# Patient Record
Sex: FEMALE | Race: WHITE | Employment: UNEMPLOYED | URBAN - METROPOLITAN AREA
[De-identification: names, ages, dates, MRNs, and addresses within clinical notes are randomized per-mention and may not be internally consistent; named-entity substitution may affect disease eponyms.]

---

## 2017-10-27 ENCOUNTER — HOSPITAL ENCOUNTER (OUTPATIENT)
Dept: INFUSION THERAPY | Age: 37
Setting detail: INFUSION SERIES
Discharge: HOME | End: 2017-10-27
Payer: COMMERCIAL

## 2017-10-27 PROCEDURE — 6360000002 HC RX W HCPCS: Performed by: OBSTETRICS & GYNECOLOGY

## 2017-10-27 PROCEDURE — 96372 THER/PROPH/DIAG INJ SC/IM: CPT

## 2017-10-27 PROCEDURE — 36415 COLL VENOUS BLD VENIPUNCTURE: CPT

## 2017-10-27 RX ADMIN — HUMAN RHO(D) IMMUNE GLOBULIN 300 MCG: 1500 SOLUTION INTRAMUSCULAR; INTRAVENOUS at 15:51

## 2017-11-02 ENCOUNTER — HOSPITAL ENCOUNTER (OUTPATIENT)
Age: 37
Discharge: HOME | End: 2017-11-02
Attending: OBSTETRICS & GYNECOLOGY | Admitting: OBSTETRICS & GYNECOLOGY
Payer: COMMERCIAL

## 2017-11-02 VITALS
BODY MASS INDEX: 29.98 KG/M2 | DIASTOLIC BLOOD PRESSURE: 71 MMHG | HEIGHT: 67 IN | WEIGHT: 191 LBS | HEART RATE: 80 BPM | RESPIRATION RATE: 20 BRPM | TEMPERATURE: 98.1 F | SYSTOLIC BLOOD PRESSURE: 120 MMHG

## 2017-11-02 LAB
ALBUMIN SERPL-MCNC: 3.4 G/DL (ref 3.9–4.9)
ALP BLD-CCNC: 63 U/L (ref 40–130)
ALT SERPL-CCNC: 14 U/L (ref 0–33)
AMPHETAMINE SCREEN, URINE: NORMAL
ANION GAP SERPL CALCULATED.3IONS-SCNC: 12 MEQ/L (ref 7–13)
AST SERPL-CCNC: 17 U/L (ref 0–35)
BARBITURATE SCREEN URINE: NORMAL
BASOPHILS ABSOLUTE: 0 K/UL (ref 0–0.2)
BASOPHILS RELATIVE PERCENT: 0.3 %
BENZODIAZEPINE SCREEN, URINE: NORMAL
BILIRUB SERPL-MCNC: 0.3 MG/DL (ref 0–1.2)
BILIRUBIN URINE: NEGATIVE
BLOOD, URINE: NEGATIVE
BUN BLDV-MCNC: 5 MG/DL (ref 6–20)
CALCIUM SERPL-MCNC: 8.6 MG/DL (ref 8.6–10.2)
CANNABINOID SCREEN URINE: NORMAL
CHLORIDE BLD-SCNC: 101 MEQ/L (ref 98–107)
CLARITY: CLEAR
CO2: 23 MEQ/L (ref 22–29)
COCAINE METABOLITE SCREEN URINE: NORMAL
COLOR: YELLOW
CREAT SERPL-MCNC: 0.5 MG/DL (ref 0.5–0.9)
EOSINOPHILS ABSOLUTE: 0.3 K/UL (ref 0–0.7)
EOSINOPHILS RELATIVE PERCENT: 2.7 %
FETAL FIBRONECTIN: NEGATIVE
GFR AFRICAN AMERICAN: >60
GFR NON-AFRICAN AMERICAN: >60
GLOBULIN: 2.4 G/DL (ref 2.3–3.5)
GLUCOSE BLD-MCNC: 102 MG/DL (ref 74–109)
GLUCOSE URINE: NEGATIVE MG/DL
HCT VFR BLD CALC: 33.3 % (ref 37–47)
HEMOGLOBIN: 11.1 G/DL (ref 12–16)
KETONES, URINE: NEGATIVE MG/DL
LEUKOCYTE ESTERASE, URINE: NEGATIVE
LYMPHOCYTES ABSOLUTE: 1.5 K/UL (ref 1–4.8)
LYMPHOCYTES RELATIVE PERCENT: 13 %
Lab: NORMAL
MCH RBC QN AUTO: 27.7 PG (ref 27–31.3)
MCHC RBC AUTO-ENTMCNC: 33.3 % (ref 33–37)
MCV RBC AUTO: 83.2 FL (ref 82–100)
MONOCYTES ABSOLUTE: 0.6 K/UL (ref 0.2–0.8)
MONOCYTES RELATIVE PERCENT: 5.7 %
NEUTROPHILS ABSOLUTE: 8.8 K/UL (ref 1.4–6.5)
NEUTROPHILS RELATIVE PERCENT: 78.3 %
NITRITE, URINE: NEGATIVE
OPIATE SCREEN URINE: NORMAL
PDW BLD-RTO: 13.9 % (ref 11.5–14.5)
PH UA: 6 (ref 5–9)
PHENCYCLIDINE SCREEN URINE: NORMAL
PLATELET # BLD: 142 K/UL (ref 130–400)
POTASSIUM SERPL-SCNC: 3.3 MEQ/L (ref 3.5–5.1)
PROTEIN UA: NEGATIVE MG/DL
RBC # BLD: 4 M/UL (ref 4.2–5.4)
SODIUM BLD-SCNC: 136 MEQ/L (ref 132–144)
SPECIFIC GRAVITY UA: 1 (ref 1–1.03)
TOTAL PROTEIN: 5.8 G/DL (ref 6.4–8.1)
UROBILINOGEN, URINE: 0.2 E.U./DL
WBC # BLD: 11.3 K/UL (ref 4.8–10.8)

## 2017-11-02 PROCEDURE — 80053 COMPREHEN METABOLIC PANEL: CPT

## 2017-11-02 PROCEDURE — 99283 EMERGENCY DEPT VISIT LOW MDM: CPT | Performed by: OBSTETRICS & GYNECOLOGY

## 2017-11-02 PROCEDURE — 82731 ASSAY OF FETAL FIBRONECTIN: CPT

## 2017-11-02 PROCEDURE — 99283 EMERGENCY DEPT VISIT LOW MDM: CPT

## 2017-11-02 PROCEDURE — 36415 COLL VENOUS BLD VENIPUNCTURE: CPT

## 2017-11-02 PROCEDURE — 80307 DRUG TEST PRSMV CHEM ANLYZR: CPT

## 2017-11-02 PROCEDURE — 81003 URINALYSIS AUTO W/O SCOPE: CPT

## 2017-11-02 PROCEDURE — 85025 COMPLETE CBC W/AUTO DIFF WBC: CPT

## 2017-11-02 RX ORDER — ACETAMINOPHEN 325 MG/1
650 TABLET ORAL EVERY 4 HOURS PRN
Status: DISCONTINUED | OUTPATIENT
Start: 2017-11-02 | End: 2017-11-02 | Stop reason: HOSPADM

## 2017-11-02 NOTE — ED TRIAGE NOTES
To triage with c/o contx, unable to time them but aware that they are there. Rates pain \"3\". Denies any recent sex, sve, LOF, VB. Denies any urinary s&s. +FM. Gown donned, unable to give ua at this time.

## 2017-11-02 NOTE — ED PROVIDER NOTES
FFN-neg and labs-ok; will d/c home to rest; increase fluids and tylenol prn pain; pt has appt with Dr. Abdon Hall tomorrow.     Ventura Goldberg, MD

## 2017-11-06 PROBLEM — R42 DIZZINESS: Status: ACTIVE | Noted: 2017-11-06

## 2017-11-06 PROBLEM — R55 SYNCOPE AND COLLAPSE: Status: ACTIVE | Noted: 2017-11-06

## 2017-11-07 ENCOUNTER — OFFICE VISIT (OUTPATIENT)
Dept: CARDIOLOGY | Age: 37
End: 2017-11-07

## 2017-11-07 VITALS
DIASTOLIC BLOOD PRESSURE: 60 MMHG | WEIGHT: 195 LBS | SYSTOLIC BLOOD PRESSURE: 110 MMHG | HEART RATE: 88 BPM | BODY MASS INDEX: 30.61 KG/M2 | HEIGHT: 67 IN

## 2017-11-07 DIAGNOSIS — R55 SYNCOPE AND COLLAPSE: ICD-10-CM

## 2017-11-07 DIAGNOSIS — R42 DIZZINESS: Primary | ICD-10-CM

## 2017-11-07 PROCEDURE — 99243 OFF/OP CNSLTJ NEW/EST LOW 30: CPT | Performed by: INTERNAL MEDICINE

## 2017-11-07 ASSESSMENT — ENCOUNTER SYMPTOMS: SHORTNESS OF BREATH: 1

## 2017-11-07 NOTE — PROGRESS NOTES
Arm   Position: Sitting   Cuff Size: Large Adult   Pulse: 88   Weight: 195 lb (88.5 kg)   Height: 5' 7\" (1.702 m)         Physical Exam:  Physical Exam   Constitutional: She is oriented to person, place, and time. She appears well-developed and well-nourished. HENT:   Head: Normocephalic and atraumatic. Right Ear: External ear normal.   Left Ear: External ear normal.   Mouth/Throat: Oropharynx is clear and moist.   Eyes: Conjunctivae and EOM are normal. Pupils are equal, round, and reactive to light. Neck: Normal range of motion. Neck supple. Cardiovascular: Normal rate, regular rhythm, normal heart sounds and intact distal pulses. Exam reveals no gallop and no friction rub. No murmur heard. Pulmonary/Chest: Effort normal and breath sounds normal. No respiratory distress. She has no wheezes. She has no rales. She exhibits no tenderness. Abdominal: Soft. Bowel sounds are normal. She exhibits no distension and no mass. There is no tenderness. There is no rebound and no guarding. pregnant   Musculoskeletal: Normal range of motion. Neurological: She is alert and oriented to person, place, and time. She has normal reflexes. Skin: Skin is warm and dry. Psychiatric: She has a normal mood and affect. Her behavior is normal. Judgment and thought content normal.           Assessment/Orders:       ICD-10-CM ICD-9-CM    1. Dizziness R42 780.4    2. Syncope and collapse R55 780.2        No orders of the defined types were placed in this encounter. There are no discontinued medications. No orders of the defined types were placed in this encounter. Plan:  1. Patient to see me in 2 weeks to discuss possible beta blocker  > I will continue to monitor patient clinically, if symptoms develop or worsen, they are to let me know ASAP or head to the nearest emergeny room.   > Follow up as discussed or call office sooner if needed.  > If refills are needed after appointment contact pharmacy.           Electronically signed by: Angélica Rivera MD  11/7/2017 3:10 PM

## 2017-12-09 ENCOUNTER — HOSPITAL ENCOUNTER (OUTPATIENT)
Dept: ULTRASOUND IMAGING | Age: 37
Discharge: HOME | End: 2017-12-09
Payer: COMMERCIAL

## 2017-12-09 DIAGNOSIS — Z34.83 ENCOUNTER FOR SUPERVISION OF NORMAL PREGNANCY IN MULTIGRAVIDA IN THIRD TRIMESTER: ICD-10-CM

## 2017-12-09 PROCEDURE — 76815 OB US LIMITED FETUS(S): CPT

## 2018-03-12 LAB
HPV, INTERPRETATION: NORMAL
PAP SMEAR: NORMAL

## 2018-04-11 ENCOUNTER — HOSPITAL ENCOUNTER (EMERGENCY)
Age: 38
Discharge: HOME | End: 2018-04-11
Attending: EMERGENCY MEDICINE
Payer: COMMERCIAL

## 2018-04-11 VITALS
BODY MASS INDEX: 28.12 KG/M2 | HEIGHT: 66 IN | HEART RATE: 78 BPM | OXYGEN SATURATION: 100 % | DIASTOLIC BLOOD PRESSURE: 78 MMHG | WEIGHT: 175 LBS | SYSTOLIC BLOOD PRESSURE: 104 MMHG | TEMPERATURE: 98 F | RESPIRATION RATE: 20 BRPM

## 2018-04-11 DIAGNOSIS — T78.1XXA ALLERGIC REACTION TO FOOD, INITIAL ENCOUNTER: Primary | ICD-10-CM

## 2018-04-11 LAB
EKG ATRIAL RATE: 74 BPM
EKG P AXIS: 46 DEGREES
EKG P-R INTERVAL: 140 MS
EKG Q-T INTERVAL: 402 MS
EKG QRS DURATION: 84 MS
EKG QTC CALCULATION (BAZETT): 446 MS
EKG R AXIS: 11 DEGREES
EKG T AXIS: -5 DEGREES
EKG VENTRICULAR RATE: 74 BPM

## 2018-04-11 PROCEDURE — 96375 TX/PRO/DX INJ NEW DRUG ADDON: CPT

## 2018-04-11 PROCEDURE — 6360000002 HC RX W HCPCS: Performed by: EMERGENCY MEDICINE

## 2018-04-11 PROCEDURE — 96365 THER/PROPH/DIAG IV INF INIT: CPT

## 2018-04-11 PROCEDURE — 99284 EMERGENCY DEPT VISIT MOD MDM: CPT

## 2018-04-11 PROCEDURE — 2580000003 HC RX 258: Performed by: EMERGENCY MEDICINE

## 2018-04-11 PROCEDURE — 93005 ELECTROCARDIOGRAM TRACING: CPT

## 2018-04-11 PROCEDURE — 2500000003 HC RX 250 WO HCPCS: Performed by: EMERGENCY MEDICINE

## 2018-04-11 PROCEDURE — S0028 INJECTION, FAMOTIDINE, 20 MG: HCPCS | Performed by: EMERGENCY MEDICINE

## 2018-04-11 PROCEDURE — 96366 THER/PROPH/DIAG IV INF ADDON: CPT

## 2018-04-11 RX ORDER — METHYLPREDNISOLONE SODIUM SUCCINATE 125 MG/2ML
125 INJECTION, POWDER, LYOPHILIZED, FOR SOLUTION INTRAMUSCULAR; INTRAVENOUS ONCE
Status: COMPLETED | OUTPATIENT
Start: 2018-04-11 | End: 2018-04-11

## 2018-04-11 RX ORDER — PREDNISONE 20 MG/1
40 TABLET ORAL DAILY
Qty: 4 TABLET | Refills: 0 | Status: SHIPPED | OUTPATIENT
Start: 2018-04-11 | End: 2018-04-13

## 2018-04-11 RX ORDER — 0.9 % SODIUM CHLORIDE 0.9 %
1000 INTRAVENOUS SOLUTION INTRAVENOUS ONCE
Status: COMPLETED | OUTPATIENT
Start: 2018-04-11 | End: 2018-04-11

## 2018-04-11 RX ORDER — SODIUM CHLORIDE 9 MG/ML
INJECTION, SOLUTION INTRAVENOUS
Status: DISCONTINUED
Start: 2018-04-11 | End: 2018-04-11 | Stop reason: HOSPADM

## 2018-04-11 RX ADMIN — FAMOTIDINE 20 MG: 10 INJECTION, SOLUTION INTRAVENOUS at 16:08

## 2018-04-11 RX ADMIN — METHYLPREDNISOLONE SODIUM SUCCINATE 125 MG: 125 INJECTION, POWDER, FOR SOLUTION INTRAMUSCULAR; INTRAVENOUS at 16:07

## 2018-04-11 RX ADMIN — DIPHENHYDRAMINE HYDROCHLORIDE 50 MG: 50 INJECTION, SOLUTION INTRAMUSCULAR; INTRAVENOUS at 16:19

## 2018-04-11 RX ADMIN — SODIUM CHLORIDE 1000 ML: 9 INJECTION, SOLUTION INTRAVENOUS at 16:08

## 2018-04-11 ASSESSMENT — ENCOUNTER SYMPTOMS
TROUBLE SWALLOWING: 0
CHEST TIGHTNESS: 1
WHEEZING: 0
SHORTNESS OF BREATH: 0
EYES NEGATIVE: 1
RHINORRHEA: 0
ABDOMINAL PAIN: 0
ALLERGIC/IMMUNOLOGIC NEGATIVE: 1
NAUSEA: 1
VOMITING: 0

## 2018-04-11 ASSESSMENT — PAIN SCALES - GENERAL: PAINLEVEL_OUTOF10: 8

## 2018-04-11 ASSESSMENT — PAIN DESCRIPTION - LOCATION: LOCATION: CHEST

## 2018-04-13 PROCEDURE — 93010 ELECTROCARDIOGRAM REPORT: CPT | Performed by: INTERNAL MEDICINE

## 2018-10-17 ENCOUNTER — OFFICE VISIT (OUTPATIENT)
Dept: FAMILY MEDICINE CLINIC | Age: 38
End: 2018-10-17
Payer: COMMERCIAL

## 2018-10-17 VITALS
TEMPERATURE: 97.1 F | HEART RATE: 81 BPM | OXYGEN SATURATION: 98 % | WEIGHT: 180.6 LBS | HEIGHT: 66 IN | DIASTOLIC BLOOD PRESSURE: 84 MMHG | RESPIRATION RATE: 12 BRPM | BODY MASS INDEX: 29.02 KG/M2 | SYSTOLIC BLOOD PRESSURE: 110 MMHG

## 2018-10-17 DIAGNOSIS — R35.0 URINARY FREQUENCY: Primary | ICD-10-CM

## 2018-10-17 DIAGNOSIS — J30.89 NON-SEASONAL ALLERGIC RHINITIS, UNSPECIFIED TRIGGER: ICD-10-CM

## 2018-10-17 DIAGNOSIS — R35.0 URINARY FREQUENCY: ICD-10-CM

## 2018-10-17 LAB
BILIRUBIN, POC: NEGATIVE
BLOOD URINE, POC: NEGATIVE
CLARITY, POC: CLEAR
COLOR, POC: YELLOW
GLUCOSE URINE, POC: NEGATIVE
KETONES, POC: NEGATIVE
LEUKOCYTE EST, POC: NEGATIVE
NITRITE, POC: NEGATIVE
PH, POC: 6
PROTEIN, POC: NEGATIVE
SPECIFIC GRAVITY, POC: 1.03
UROBILINOGEN, POC: NEGATIVE

## 2018-10-17 PROCEDURE — 1036F TOBACCO NON-USER: CPT | Performed by: NURSE PRACTITIONER

## 2018-10-17 PROCEDURE — G8427 DOCREV CUR MEDS BY ELIG CLIN: HCPCS | Performed by: NURSE PRACTITIONER

## 2018-10-17 PROCEDURE — G8417 CALC BMI ABV UP PARAM F/U: HCPCS | Performed by: NURSE PRACTITIONER

## 2018-10-17 PROCEDURE — 81003 URINALYSIS AUTO W/O SCOPE: CPT | Performed by: NURSE PRACTITIONER

## 2018-10-17 PROCEDURE — G8484 FLU IMMUNIZE NO ADMIN: HCPCS | Performed by: NURSE PRACTITIONER

## 2018-10-17 PROCEDURE — 99213 OFFICE O/P EST LOW 20 MIN: CPT | Performed by: NURSE PRACTITIONER

## 2018-10-17 RX ORDER — FLUTICASONE PROPIONATE 50 MCG
SPRAY, SUSPENSION (ML) NASAL
Qty: 1 BOTTLE | Refills: 3 | Status: SHIPPED | OUTPATIENT
Start: 2018-10-17

## 2018-10-17 RX ORDER — CIPROFLOXACIN 500 MG/1
500 TABLET, FILM COATED ORAL 2 TIMES DAILY
Qty: 10 TABLET | Refills: 0 | Status: SHIPPED | OUTPATIENT
Start: 2018-10-17 | End: 2018-10-22

## 2018-10-17 ASSESSMENT — ENCOUNTER SYMPTOMS
CONSTIPATION: 0
ABDOMINAL PAIN: 0
VOMITING: 0
DIARRHEA: 0
NAUSEA: 0

## 2018-10-17 ASSESSMENT — PATIENT HEALTH QUESTIONNAIRE - PHQ9
SUM OF ALL RESPONSES TO PHQ9 QUESTIONS 1 & 2: 0
SUM OF ALL RESPONSES TO PHQ QUESTIONS 1-9: 0
1. LITTLE INTEREST OR PLEASURE IN DOING THINGS: 0
SUM OF ALL RESPONSES TO PHQ QUESTIONS 1-9: 0
2. FEELING DOWN, DEPRESSED OR HOPELESS: 0

## 2018-10-17 NOTE — PROGRESS NOTES
you to get things done or get along with other people? 0 - Normal difficulty    Personal, Social, Family History and Allergies    Patient's medications, allergies, past medical, surgical, social and family histories were reviewed and updated as appropriate. ROS    Review of Systems   Constitutional: Negative for activity change, appetite change, chills, diaphoresis, fatigue and fever. Gastrointestinal: Negative for abdominal pain, constipation, diarrhea, nausea and vomiting. Genitourinary: Positive for dysuria and frequency. Negative for decreased urine volume, dyspareunia, enuresis, flank pain, genital sores, hematuria, menstrual problem, pelvic pain, urgency, vaginal bleeding, vaginal discharge and vaginal pain. Allergic/Immunologic: Negative for environmental allergies and food allergies. Vitals    /84   Pulse 81   Temp 97.1 °F (36.2 °C) (Temporal)   Resp 12   Ht 5' 6\" (1.676 m)   Wt 180 lb 9.6 oz (81.9 kg)   LMP 09/17/2018 (Approximate)   SpO2 98%   BMI 29.15 kg/m²     BP Readings from Last 3 Encounters:   10/17/18 110/84   04/11/18 104/78   11/07/17 110/60         Wt Readings from Last 3 Encounters:   10/17/18 180 lb 9.6 oz (81.9 kg)   04/11/18 175 lb (79.4 kg)   11/07/17 195 lb (88.5 kg)       Objective    Physical Exam   Constitutional: She is oriented to person, place, and time. Vital signs are normal. She appears well-developed and well-nourished. HENT:   Head: Normocephalic. Eyes: Conjunctivae and EOM are normal.   Neck: Normal range of motion. Pulmonary/Chest: Effort normal.   Abdominal: Normal appearance. There is no tenderness. There is no CVA tenderness. Musculoskeletal: Normal range of motion. Neurological: She is alert and oriented to person, place, and time. Skin: Skin is warm and dry. Psychiatric: She has a normal mood and affect.  Her speech is normal and behavior is normal. Judgment and thought content normal. Cognition and memory are normal.   Nursing note and vitals reviewed. Assessment and Treatment     Diagnosis Orders   1. Urinary frequency  POCT Urinalysis No Micro (Auto)    Urine Culture    ciprofloxacin (CIPRO) 500 MG tablet   2. Non-seasonal allergic rhinitis, unspecified trigger  fluticasone (FLONASE) 50 MCG/ACT nasal spray       Plan and Follow Up    Orders Placed This Encounter   Procedures    Urine Culture     Standing Status:   Future     Number of Occurrences:   1     Standing Expiration Date:   10/17/2019     Order Specific Question:   Specify (ex-cath, midstream, cysto, etc)? Answer:   midstream    POCT Urinalysis No Micro (Auto)       Orders Placed This Encounter   Medications    ciprofloxacin (CIPRO) 500 MG tablet     Sig: Take 1 tablet by mouth 2 times daily for 5 days     Dispense:  10 tablet     Refill:  0    fluticasone (FLONASE) 50 MCG/ACT nasal spray     Si sprays each nostril daily. Dispense:  1 Bottle     Refill:  3       Return if symptoms worsen or fail to improve. Antibiotics: To prevent antibiotic resistances please take medication as ordered and for thefull duration even if you start to feel better. Females: Consider intake of yogurt or probiotic during antibiotic use and for a few daysafter to help reduce the risk of superinfection (yeast infection). Separate the yogurt and antibiotic by at least 1 hour. Avoid Alcohol . Will complete test of cure based off of culture results. Typically 10 days after treatment is finished. Reviewed with the patient: current clinical status, medications, activities and diet. Side effects,adverse effects of the medication prescribed today, as well as treatment plan/ rationale and result expectations have been discussed with the patient who expresses understanding and desires to proceed. Close followup to evaluate treatment results and for coordination of care.   I have reviewed the patient's medical history in detail and updated the computerized patient

## 2018-10-20 LAB
ORGANISM: ABNORMAL
URINE CULTURE, ROUTINE: ABNORMAL
URINE CULTURE, ROUTINE: ABNORMAL

## 2018-10-24 ENCOUNTER — OFFICE VISIT (OUTPATIENT)
Dept: FAMILY MEDICINE CLINIC | Age: 38
End: 2018-10-24
Payer: COMMERCIAL

## 2018-10-24 VITALS
WEIGHT: 183 LBS | DIASTOLIC BLOOD PRESSURE: 70 MMHG | RESPIRATION RATE: 16 BRPM | HEIGHT: 66 IN | TEMPERATURE: 97.3 F | SYSTOLIC BLOOD PRESSURE: 112 MMHG | HEART RATE: 80 BPM | BODY MASS INDEX: 29.41 KG/M2

## 2018-10-24 DIAGNOSIS — S39.012A STRAIN OF LUMBAR REGION, INITIAL ENCOUNTER: Primary | ICD-10-CM

## 2018-10-24 PROCEDURE — G8427 DOCREV CUR MEDS BY ELIG CLIN: HCPCS | Performed by: NURSE PRACTITIONER

## 2018-10-24 PROCEDURE — G8417 CALC BMI ABV UP PARAM F/U: HCPCS | Performed by: NURSE PRACTITIONER

## 2018-10-24 PROCEDURE — 99213 OFFICE O/P EST LOW 20 MIN: CPT | Performed by: NURSE PRACTITIONER

## 2018-10-24 PROCEDURE — 1036F TOBACCO NON-USER: CPT | Performed by: NURSE PRACTITIONER

## 2018-10-24 PROCEDURE — G8484 FLU IMMUNIZE NO ADMIN: HCPCS | Performed by: NURSE PRACTITIONER

## 2018-10-24 RX ORDER — BACLOFEN 10 MG/1
10 TABLET ORAL 3 TIMES DAILY PRN
Qty: 15 TABLET | Refills: 0 | Status: SHIPPED | OUTPATIENT
Start: 2018-10-24 | End: 2018-10-24 | Stop reason: SDUPTHER

## 2018-10-24 RX ORDER — BACLOFEN 10 MG/1
10 TABLET ORAL 3 TIMES DAILY PRN
Qty: 15 TABLET | Refills: 0 | Status: SHIPPED | OUTPATIENT
Start: 2018-10-24

## 2018-10-24 RX ORDER — METHYLPREDNISOLONE 4 MG/1
TABLET ORAL
Qty: 1 KIT | Refills: 0 | Status: SHIPPED | OUTPATIENT
Start: 2018-10-24

## 2018-10-24 RX ORDER — METHYLPREDNISOLONE 4 MG/1
TABLET ORAL
Qty: 1 KIT | Refills: 0 | Status: SHIPPED | OUTPATIENT
Start: 2018-10-24 | End: 2018-10-24 | Stop reason: SDUPTHER

## 2018-10-24 ASSESSMENT — ENCOUNTER SYMPTOMS
CHEST TIGHTNESS: 0
ABDOMINAL DISTENTION: 0
NAUSEA: 0
SHORTNESS OF BREATH: 0
DIARRHEA: 0
CONSTIPATION: 0
ABDOMINAL PAIN: 0
BACK PAIN: 1
VOMITING: 0

## 2019-01-24 DIAGNOSIS — Z00.00 WELLNESS EXAMINATION: Primary | ICD-10-CM

## 2019-01-24 DIAGNOSIS — Z13.220 SCREENING FOR LIPID DISORDERS: ICD-10-CM

## 2019-08-26 RX ORDER — LORATADINE AND PSEUDOEPHEDRINE 10; 240 MG/1; MG/1
1 TABLET, EXTENDED RELEASE ORAL
COMMUNITY
Start: 2019-02-25

## 2019-08-26 RX ORDER — PHENAZOPYRIDINE HYDROCHLORIDE 200 MG/1
200 TABLET, FILM COATED ORAL
COMMUNITY
Start: 2019-02-01 | End: 2021-09-20 | Stop reason: ALTCHOICE

## 2019-08-26 RX ORDER — FLUTICASONE PROPIONATE 50 MCG
1 SPRAY, SUSPENSION (ML) NASAL
COMMUNITY
Start: 2019-02-25

## 2019-08-26 RX ORDER — FAMOTIDINE 20 MG/1
20 TABLET, FILM COATED ORAL
COMMUNITY
Start: 2019-03-20

## 2020-03-05 ENCOUNTER — TELEPHONE (OUTPATIENT)
Dept: FAMILY MEDICINE CLINIC | Age: 40
End: 2020-03-05

## 2021-09-20 ENCOUNTER — APPOINTMENT (OUTPATIENT)
Dept: CT IMAGING | Age: 41
End: 2021-09-20
Payer: COMMERCIAL

## 2021-09-20 ENCOUNTER — HOSPITAL ENCOUNTER (EMERGENCY)
Age: 41
Discharge: HOME | End: 2021-09-20
Attending: EMERGENCY MEDICINE
Payer: COMMERCIAL

## 2021-09-20 VITALS
WEIGHT: 190 LBS | RESPIRATION RATE: 18 BRPM | HEART RATE: 102 BPM | DIASTOLIC BLOOD PRESSURE: 89 MMHG | TEMPERATURE: 100.8 F | SYSTOLIC BLOOD PRESSURE: 129 MMHG | OXYGEN SATURATION: 99 % | BODY MASS INDEX: 30.67 KG/M2

## 2021-09-20 DIAGNOSIS — N39.0 URINARY TRACT INFECTION WITHOUT HEMATURIA, SITE UNSPECIFIED: Primary | ICD-10-CM

## 2021-09-20 LAB
ALBUMIN SERPL-MCNC: 4.4 G/DL (ref 3.5–4.6)
ALP BLD-CCNC: 105 U/L (ref 40–130)
ALT SERPL-CCNC: 46 U/L (ref 0–33)
ANION GAP SERPL CALCULATED.3IONS-SCNC: 12 MEQ/L (ref 9–15)
AST SERPL-CCNC: 44 U/L (ref 0–35)
BACTERIA: ABNORMAL /HPF
BASOPHILS ABSOLUTE: 0 K/UL (ref 0–0.1)
BASOPHILS RELATIVE PERCENT: 0.2 % (ref 0.1–1.2)
BILIRUB SERPL-MCNC: <0.2 MG/DL (ref 0.2–0.7)
BILIRUBIN URINE: NEGATIVE
BLOOD, URINE: NORMAL
BUN BLDV-MCNC: 8 MG/DL (ref 6–20)
CALCIUM SERPL-MCNC: 9.8 MG/DL (ref 8.5–9.9)
CHLORIDE BLD-SCNC: 104 MEQ/L (ref 95–107)
CLARITY: CLEAR
CO2: 22 MEQ/L (ref 20–31)
COLOR: YELLOW
CREAT SERPL-MCNC: 0.5 MG/DL (ref 0.5–0.9)
EOSINOPHILS ABSOLUTE: 0.1 K/UL (ref 0–0.4)
EOSINOPHILS RELATIVE PERCENT: 1.4 % (ref 0.7–5.8)
EPITHELIAL CELLS, UA: ABNORMAL /HPF
GFR AFRICAN AMERICAN: >60
GFR NON-AFRICAN AMERICAN: >60
GLOBULIN: 2.6 G/DL (ref 2.3–3.5)
GLUCOSE BLD-MCNC: 103 MG/DL (ref 70–99)
GLUCOSE URINE: NEGATIVE MG/DL
HCG(URINE) PREGNANCY TEST: NEGATIVE
HCT VFR BLD CALC: 37.4 % (ref 37–47)
HEMOGLOBIN: 12.2 G/DL (ref 11.2–15.7)
IMMATURE GRANULOCYTES #: 0 K/UL
IMMATURE GRANULOCYTES %: 0.2 %
KETONES, URINE: NEGATIVE MG/DL
LEUKOCYTE ESTERASE, URINE: NORMAL
LYMPHOCYTES ABSOLUTE: 0.5 K/UL (ref 1.2–3.7)
LYMPHOCYTES RELATIVE PERCENT: 9.2 %
MCH RBC QN AUTO: 27.5 PG (ref 25.6–32.2)
MCHC RBC AUTO-ENTMCNC: 32.6 % (ref 32.2–35.5)
MCV RBC AUTO: 84.2 FL (ref 79.4–94.8)
MONOCYTES ABSOLUTE: 0.5 K/UL (ref 0.2–0.9)
MONOCYTES RELATIVE PERCENT: 10.1 % (ref 4.7–12.5)
NEUTROPHILS ABSOLUTE: 3.8 K/UL (ref 1.6–6.1)
NEUTROPHILS RELATIVE PERCENT: 78.9 % (ref 34–71.1)
NITRITE, URINE: NEGATIVE
PDW BLD-RTO: 13.3 % (ref 11.7–14.4)
PH UA: 7 (ref 5–9)
PLATELET # BLD: 170 K/UL (ref 182–369)
POTASSIUM SERPL-SCNC: 3.8 MEQ/L (ref 3.4–4.9)
PROTEIN UA: NORMAL MG/DL
RBC # BLD: 4.44 M/UL (ref 3.93–5.22)
RBC UA: ABNORMAL /HPF (ref 0–2)
SODIUM BLD-SCNC: 138 MEQ/L (ref 135–144)
SPECIFIC GRAVITY UA: 1.02 (ref 1–1.03)
TOTAL PROTEIN: 7 G/DL (ref 6.3–8)
URINE REFLEX TO CULTURE: YES
UROBILINOGEN, URINE: 0.2 E.U./DL
WBC # BLD: 4.9 K/UL (ref 4–10)
WBC UA: ABNORMAL /HPF (ref 0–5)

## 2021-09-20 PROCEDURE — 81001 URINALYSIS AUTO W/SCOPE: CPT

## 2021-09-20 PROCEDURE — 80053 COMPREHEN METABOLIC PANEL: CPT

## 2021-09-20 PROCEDURE — 87077 CULTURE AEROBIC IDENTIFY: CPT

## 2021-09-20 PROCEDURE — 74176 CT ABD & PELVIS W/O CONTRAST: CPT

## 2021-09-20 PROCEDURE — 96365 THER/PROPH/DIAG IV INF INIT: CPT

## 2021-09-20 PROCEDURE — 6360000002 HC RX W HCPCS: Performed by: EMERGENCY MEDICINE

## 2021-09-20 PROCEDURE — 87186 SC STD MICRODIL/AGAR DIL: CPT

## 2021-09-20 PROCEDURE — 87086 URINE CULTURE/COLONY COUNT: CPT

## 2021-09-20 PROCEDURE — 6370000000 HC RX 637 (ALT 250 FOR IP): Performed by: EMERGENCY MEDICINE

## 2021-09-20 PROCEDURE — 2580000003 HC RX 258: Performed by: EMERGENCY MEDICINE

## 2021-09-20 PROCEDURE — 99282 EMERGENCY DEPT VISIT SF MDM: CPT

## 2021-09-20 PROCEDURE — 85025 COMPLETE CBC W/AUTO DIFF WBC: CPT

## 2021-09-20 PROCEDURE — 84703 CHORIONIC GONADOTROPIN ASSAY: CPT

## 2021-09-20 RX ORDER — 0.9 % SODIUM CHLORIDE 0.9 %
1000 INTRAVENOUS SOLUTION INTRAVENOUS ONCE
Status: COMPLETED | OUTPATIENT
Start: 2021-09-20 | End: 2021-09-20

## 2021-09-20 RX ORDER — ACETAMINOPHEN 325 MG/1
650 TABLET ORAL ONCE
Status: COMPLETED | OUTPATIENT
Start: 2021-09-20 | End: 2021-09-20

## 2021-09-20 RX ORDER — PHENAZOPYRIDINE HYDROCHLORIDE 100 MG/1
100 TABLET, FILM COATED ORAL 3 TIMES DAILY PRN
Qty: 6 TABLET | Refills: 0 | Status: SHIPPED | OUTPATIENT
Start: 2021-09-20 | End: 2021-09-22

## 2021-09-20 RX ORDER — CEPHALEXIN 500 MG/1
500 CAPSULE ORAL 3 TIMES DAILY
Qty: 21 CAPSULE | Refills: 0 | Status: SHIPPED | OUTPATIENT
Start: 2021-09-20 | End: 2021-09-27

## 2021-09-20 RX ADMIN — ACETAMINOPHEN 650 MG: 325 TABLET ORAL at 20:00

## 2021-09-20 RX ADMIN — CEFTRIAXONE 1000 MG: 1 INJECTION, POWDER, FOR SOLUTION INTRAMUSCULAR; INTRAVENOUS at 20:01

## 2021-09-20 RX ADMIN — SODIUM CHLORIDE 1000 ML: 9 INJECTION, SOLUTION INTRAVENOUS at 19:59

## 2021-09-20 ASSESSMENT — PAIN DESCRIPTION - ONSET: ONSET: ON-GOING

## 2021-09-20 ASSESSMENT — PAIN DESCRIPTION - PROGRESSION: CLINICAL_PROGRESSION: GRADUALLY IMPROVING

## 2021-09-20 ASSESSMENT — PAIN SCALES - GENERAL
PAINLEVEL_OUTOF10: 2
PAINLEVEL_OUTOF10: 8

## 2021-09-20 ASSESSMENT — PAIN - FUNCTIONAL ASSESSMENT: PAIN_FUNCTIONAL_ASSESSMENT: 0-10

## 2021-09-20 ASSESSMENT — PAIN DESCRIPTION - LOCATION: LOCATION: ABDOMEN

## 2021-09-20 ASSESSMENT — ENCOUNTER SYMPTOMS: ABDOMINAL PAIN: 1

## 2021-09-20 ASSESSMENT — PAIN DESCRIPTION - PAIN TYPE: TYPE: ACUTE PAIN

## 2021-09-20 ASSESSMENT — PAIN DESCRIPTION - DESCRIPTORS: DESCRIPTORS: ACHING

## 2021-09-20 ASSESSMENT — PAIN DESCRIPTION - FREQUENCY: FREQUENCY: CONTINUOUS

## 2021-09-20 NOTE — ED TRIAGE NOTES
Complains of difficulty urinating starting 4 days ago. worse today. Right flank pain starting this morning. 100.8f in triage. Motrin 1 hour PTA.

## 2021-09-21 NOTE — ED PROVIDER NOTES
[facility] ED  EMERGENCY DEPARTMENT ENCOUNTER      Pt Name: Ivan Yeh  MRN: [id number]  [place] 1980  Date of evaluation: 2021  Provider: Brando Quick MD    CHIEF COMPLAINT       Chief Complaint   Patient presents with    Dysuria    Flank Pain         HISTORY OF PRESENT ILLNESS   (Location/Symptom, Timing/Onset, Context/Setting, Quality, Duration, Modifying Factors, Severity)  Note limiting factors. 45-year-old female presenting with urinary symptoms of dysuria/ urgency. Symptoms started yesterday and patient took over-the-counter medication with some improvement noted. Today she noted right-sided flank pain that has since resolved with Motrin. Denies any vomiting. No significant fevers. Denies any abdominal pain. Nursing Notes were reviewed. REVIEW OF SYSTEMS    (2-9 systems for level 4, 10 or more for level 5)     Review of Systems   Gastrointestinal: Positive for abdominal pain. Genitourinary: Positive for dysuria and urgency. All other systems reviewed and are negative. Except as noted above the remainder of the review of systems was reviewed and negative.        PAST MEDICAL HISTORY     Past Medical History:   Diagnosis Date    Asthma     Dizziness 2017    Syncope and collapse 2017         SURGICAL HISTORY       Past Surgical History:   Procedure Laterality Date     SECTION, LOW TRANSVERSE      TUBAL LIGATION           CURRENT MEDICATIONS       Discharge Medication List as of 2021  9:01 PM      CONTINUE these medications which have NOT CHANGED    Details   famotidine (PEPCID) 20 MG tablet Take 20 mg by mouthHistorical Med      loratadine-pseudoephedrine (CLARITIN-D 24HR)  MG per extended release tablet Take 1 tablet by mouthHistorical Med      !! fluticasone (FLONASE) 50 MCG/ACT nasal spray 1 sprayHistorical Med      methylPREDNISolone (MEDROL, NAM,) 4 MG tablet Take by mouth., Disp-1 kit, R-0Normal      baclofen (LIORESAL) 10 MG tablet Take 1 tablet by mouth 3 times daily as needed (muscle spasm), Disp-15 tablet, R-0Normal      !! fluticasone (FLONASE) 50 MCG/ACT nasal spray 1 sprays each nostril daily. , Disp-1 Bottle, R-3Normal       !! - Potential duplicate medications found. Please discuss with provider. ALLERGIES     Nuts [peanut-containing drug products]    FAMILY HISTORY     History reviewed. No pertinent family history. SOCIAL HISTORY       Social History     Socioeconomic History    Marital status:      Spouse name: None    Number of children: None    Years of education: None    Highest education level: None   Occupational History    None   Tobacco Use    Smoking status: Never Smoker    Smokeless tobacco: Never Used   Substance and Sexual Activity    Alcohol use: Yes     Comment: occasionally    Drug use: No    Sexual activity: Not Currently     Partners: Male   Other Topics Concern    None   Social History Narrative    None     Social Determinants of Health     Financial Resource Strain:     Difficulty of Paying Living Expenses:    Food Insecurity:     Worried About Running Out of Food in the Last Year:     Ran Out of Food in the Last Year:    Transportation Needs:     Lack of Transportation (Medical):      Lack of Transportation (Non-Medical):    Physical Activity:     Days of Exercise per Week:     Minutes of Exercise per Session:    Stress:     Feeling of Stress :    Social Connections:     Frequency of Communication with Friends and Family:     Frequency of Social Gatherings with Friends and Family:     Attends [employer] Services:     Active Member of Clubs or Organizations:     Attends Club or Organization Meetings:     Marital Status:    Intimate Partner Violence:     Fear of Current or Ex-Partner:     Emotionally Abused:     Physically Abused:     Sexually Abused:        SCREENINGS               PHYSICAL EXAM    (up to 7 for level 4, 8 or more for level 5)     ED Triage Vitals [09/20/21 1911]   BP Temp Temp Source Pulse Resp SpO2 Height Weight   129/89 100.8 °F (38.2 °C) Oral 102 18 97 % -- 190 lb (86.2 kg)       Physical Exam  Vitals and nursing note reviewed. Constitutional:       General: She is not in acute distress. Appearance: Normal appearance. She is well-developed. She is not ill-appearing. HENT:      Head: Normocephalic and atraumatic. Mouth/Throat:      Mouth: Mucous membranes are moist.      Pharynx: Oropharynx is clear. Eyes:      Extraocular Movements: Extraocular movements intact. Conjunctiva/sclera: Conjunctivae normal.   Cardiovascular:      Rate and Rhythm: Normal rate and regular rhythm. Pulmonary:      Effort: Pulmonary effort is normal.      Breath sounds: Normal breath sounds. Abdominal:      General: Bowel sounds are normal.      Palpations: Abdomen is soft. Tenderness: There is no abdominal tenderness. There is no right CVA tenderness, left CVA tenderness, guarding or rebound. Musculoskeletal:         General: No deformity. Normal range of motion. Cervical back: Normal range of motion and neck supple. Skin:     General: Skin is warm and dry. Capillary Refill: Capillary refill takes less than 2 seconds. Neurological:      General: No focal deficit present. Mental Status: She is alert and oriented to person, place, and time. Mental status is at baseline. Cranial Nerves: No cranial nerve deficit. Psychiatric:         Thought Content:  Thought content normal.         DIAGNOSTIC RESULTS     EKG: All EKG's are interpreted by the Emergency Department Physician who either signs or Co-signs this chart in the absence of a cardiologist.    RADIOLOGY:   Non-plain film images such as CT, Ultrasound and MRI are read by the radiologist. Plain radiographic images are visualized and preliminarily interpreted by the emergency physician with the below findings:    Interpretation per the Radiologist below, if available at the time of this note:    CT ABDOMEN PELVIS WO CONTRAST Additional Contrast? None    (Results Pending)       LABS:  Labs Reviewed   COMPREHENSIVE METABOLIC PANEL - Abnormal; Notable for the following components:       Result Value    Glucose 103 (*)     ALT 46 (*)     AST 44 (*)     All other components within normal limits   CBC WITH AUTO DIFFERENTIAL - Abnormal; Notable for the following components:    Platelets 207 (*)     Neutrophils % 78.9 (*)     Lymphocytes Absolute 0.5 (*)     All other components within normal limits   MICROSCOPIC URINALYSIS - Abnormal; Notable for the following components:    RBC, UA 10-20 (*)     Bacteria, UA MODERATE (*)     All other components within normal limits   CULTURE, URINE   URINE RT REFLEX TO CULTURE   PREGNANCY, URINE       All other labs were within normal range or not returned as of this dictation. EMERGENCY DEPARTMENT COURSE and DIFFERENTIAL DIAGNOSIS/MDM:   Vitals:    Vitals:    09/20/21 1911 09/20/21 2130   BP: 129/89    Pulse: 102    Resp: 18    Temp: 100.8 °F (38.2 °C)    TempSrc: Oral    SpO2: 97% 99%   Weight: 190 lb (86.2 kg)        MDM  Number of Diagnoses or Management Options  Urinary tract infection without hematuria, site unspecified  Diagnosis management comments: Presents with symptoms consistent with UTI. CT scan shows no obstructing stone but does show a distended gallbladder. Patient with slightly elevated liver enzymes. Unable to get ultrasound at this hour. She states presently that she has no abdominal complaints and her repeat abdominal exam is soft and nontender. Instructed to get ultrasound as outpatient for distended gallbladder if symptoms persist after treatment of UTI. Patient will be discharged home in good condition. Patient has been hemodynamically stable throughout ED course and is appropriate for outpatient follow up. Patient should follow up with PCP in 2-3 days or return to ED immediately for any new or worsening symptoms.

## 2021-09-23 LAB
ORGANISM: ABNORMAL
URINE CULTURE, ROUTINE: ABNORMAL

## 2023-05-22 ENCOUNTER — APPOINTMENT (OUTPATIENT)
Dept: WOMENS IMAGING | Age: 43
End: 2023-05-22
Payer: COMMERCIAL

## 2023-05-22 ENCOUNTER — HOSPITAL ENCOUNTER (OUTPATIENT)
Dept: ULTRASOUND IMAGING | Age: 43
Discharge: HOME | End: 2023-05-24
Payer: COMMERCIAL

## 2023-05-22 ENCOUNTER — HOSPITAL ENCOUNTER (OUTPATIENT)
Dept: WOMENS IMAGING | Age: 43
Discharge: HOME | End: 2023-05-24
Payer: COMMERCIAL

## 2023-05-22 VITALS — BODY MASS INDEX: 29.76 KG/M2 | HEIGHT: 67 IN

## 2023-05-22 DIAGNOSIS — R92.8 ABNORMAL MAMMOGRAM: ICD-10-CM

## 2023-05-22 PROCEDURE — G0279 TOMOSYNTHESIS, MAMMO: HCPCS

## 2023-05-22 PROCEDURE — 76642 ULTRASOUND BREAST LIMITED: CPT

## 2024-01-16 ENCOUNTER — CONSULT (OUTPATIENT)
Dept: ENDOCRINOLOGY | Facility: CLINIC | Age: 44
End: 2024-01-16
Payer: COMMERCIAL

## 2024-01-16 VITALS
HEART RATE: 74 BPM | BODY MASS INDEX: 31.21 KG/M2 | TEMPERATURE: 98.3 F | DIASTOLIC BLOOD PRESSURE: 82 MMHG | WEIGHT: 194.2 LBS | SYSTOLIC BLOOD PRESSURE: 121 MMHG | HEIGHT: 66 IN

## 2024-01-16 DIAGNOSIS — Z01.812 ENCOUNTER FOR PREPROCEDURAL LABORATORY EXAMINATION: ICD-10-CM

## 2024-01-16 DIAGNOSIS — N97.1 TUBAL OCCLUSION: ICD-10-CM

## 2024-01-16 DIAGNOSIS — Z31.41 FERTILITY TESTING: Primary | ICD-10-CM

## 2024-01-16 PROCEDURE — 99204 OFFICE O/P NEW MOD 45 MIN: CPT | Performed by: OBSTETRICS & GYNECOLOGY

## 2024-01-16 PROCEDURE — 99214 OFFICE O/P EST MOD 30 MIN: CPT | Performed by: OBSTETRICS & GYNECOLOGY

## 2024-01-16 RX ORDER — LORATADINE 10 MG/1
10 TABLET ORAL DAILY
COMMUNITY

## 2024-01-16 RX ORDER — FAMOTIDINE 20 MG/1
20 TABLET, FILM COATED ORAL DAILY
COMMUNITY

## 2024-01-16 ASSESSMENT — COLUMBIA-SUICIDE SEVERITY RATING SCALE - C-SSRS
2. HAVE YOU ACTUALLY HAD ANY THOUGHTS OF KILLING YOURSELF?: NO
6. HAVE YOU EVER DONE ANYTHING, STARTED TO DO ANYTHING, OR PREPARED TO DO ANYTHING TO END YOUR LIFE?: NO
1. IN THE PAST MONTH, HAVE YOU WISHED YOU WERE DEAD OR WISHED YOU COULD GO TO SLEEP AND NOT WAKE UP?: NO

## 2024-01-16 ASSESSMENT — PAIN SCALES - GENERAL: PAINLEVEL: 0-NO PAIN

## 2024-01-16 ASSESSMENT — PATIENT HEALTH QUESTIONNAIRE - PHQ9
1. LITTLE INTEREST OR PLEASURE IN DOING THINGS: NOT AT ALL
SUM OF ALL RESPONSES TO PHQ9 QUESTIONS 1 AND 2: 0
2. FEELING DOWN, DEPRESSED OR HOPELESS: NOT AT ALL

## 2024-01-16 ASSESSMENT — LIFESTYLE VARIABLES
TOBACCO_USE: NO
HISTORY_ALCOHOL_USE: NO

## 2024-01-16 NOTE — PROGRESS NOTES
In Person    NEW FERTILITY PATIENT VISIT    Referred by:    Accompanied today by: Who is accompanying you to your visit:: Spouse Ru Wallis is a 43 y.o.  female who presents today to discuss the potential of proceeding with fallopian tube anastomosis.  The patient reports that she has had 7 pregnancies in the past, 6 of which were 1 prior partners and 1 was with her current partner.  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  Her most recent delivery was in 2018 by  section, and she had a bilateral tubal ligation performed at that time (performed at [facility]).  She is uncertain which method was used to perform tubal ligation, and records are not available from that procedure at this time.  She and her partner now report that they are interested in the possibility of having more children.  She does report having regular, monthly menses that occur 30-day intervals.       OB Hx     OB History          7    Para   6    Term   6       0    AB   1    Living   6         SAB   1    IAB   0    Ectopic   0    Multiple   1    Live Births   6                 GYN HISTORY   Have you ever been diagnosed with a sexually transmitted disease? Have you ever been diagnosed with a sexually transmitted disease?: No    Please select all that are applicable:    Have you ever had Pelvic Inflammatory Disease? Have you ever had Pelvic Inflammatory Disease?: No    Have you had an abnormal PAP smear? Have you had an abnormal PAP smear?: Yes    Date & Result of last PAP smear: per EMR, last pap was performed at [facility] in 2019, negative  Have you ever had an abnormal Mammogram? Have you ever had an abnormal mammogram?: Yes    Date & result of your last mammogram: Date of last mammogram:: 2023    Do you have pelvic pain? Do you have pelvic pain?: No    How many times per week do you have intercourse? If  applicable, how many times a week are you having intercourse, trying to get pregnant during your fertile window?: N/A    Do you have pain with intercourse? Do you have pain with intercourse?: No    Do you use lubricants with intercourse? If applicable, what type of lubricant are you using?: None    Do you have pain with bowel movements? Do you have pain with bowel movements?: No              Do you have pain with a full bladder? Do you have pain with a full bladder?: No    MENSTRUAL HISTORY  LMP: When was your last menstrual period?: Other    Menarche: If applicable, when was your first occurrence of menstruation?: 12 yr    Contraception: What (if any) type of birth control do you currently use?: Tubal    Cycle length: What is the average number of days between menstrual cycles?: 28    Describe your bleeding: Describe your bleeding:: Average    Dysmenorrhea: Are your menstrual periods painful?: No       ENDOCRINE/INFERTILITY HISTORY  Duration of infertility: If applicable, what is the approximate date you began trying to get pregnant?: Not applicable    Coital Activity/week: If applicable, how many times a week are you having intercourse, trying to get pregnant during your fertile window?: N/A    Nipple Discharge: Do you experience any loss of milk or liquid discharge from the breasts?: No    Vision changes: Are you experiencing any vision changes?: No    Headaches: Are you experiencing headaches?: No    Excess hair growth: Are you experiencing persistent or worsening hair growth on the face, breasts or lower abdomen?: No    Excessive hair loss: Are you experiencing loss of hair from your scalp?: No    Acne: Are you experiencing acne?: No    Oily skin: Oily skin?: No    Recent weight change  Weight gain: Are you experiencing any increase in weight?: No    Weight loss: Are you experiencing any decrease in weight?: No    Exercise more than 3 times a week: Exercise more than 3 times a week?: No      PMH  History  reviewed. No pertinent past medical history.     MEDICATIONS  Current Outpatient Medications on File Prior to Visit   Medication Sig Dispense Refill    famotidine (Pepcid) 20 mg tablet Take 1 tablet (20 mg) by mouth once daily.      loratadine (Claritin) 10 mg tablet Take 1 tablet (10 mg) by mouth once daily.       No current facility-administered medications on file prior to visit.        PSH  Past Surgical History:   Procedure Laterality Date    OTHER SURGICAL HISTORY  2022     section    TUBAL LIGATION          PSYCH HISTORY  Have you ever been diagnosed with a mental health Issue?: No    Have you ever been hospitalized for a mental health disorder?: No       SOCIAL HISTORY  Social History     Tobacco Use    Smoking status: Never    Smokeless tobacco: Never   Substance Use Topics    Alcohol use: Yes    Drug use: Never     Occupation: Your occupation:: RN    Have you ever been incarcerated? Have you ever been incarcerated?: No    Do you have a history of domestic violence? Do you have a history of domestic violence?: No    Do you feel safe at home? Do you feel safe at home?: Yes    Do you have a history of any negative sexual experience such as incest or rape? Do you have a history of any negative sexual experience such as incest or rape?: No       PARTNER HISTORY  Partner Name: Partner name:: Ru Wallis    : Partner :: 85    Occupation: Partner occupation:: Construction    Prior fertility history: Partner Prior Fertility History:: None//fertile    PMH: Partner Past Medical History:: Epilepsy    PSH: Partner Past Surgical History:: Knee surgery    Smoking:Partner: Recent or current tobacco use: Yes    Alcohol Use: Partner: Recent or current alcohol use: No    Drug Use: Partner: Recent or current drug use: No    Medications: Partner Medications: Keppra, Oxtellar    Injuries: Partner: Any history of surgeries or injuries in the reproductive area?: No    STD: Have you ever been diagnosed  "with a sexually transmitted disease?: No    Please select all that are applicable:    SA: Prior Semen Analysis completed?: No    SA Results:      FAMILY HISTORY  Family History   Problem Relation Name Age of Onset    Prostate cancer Paternal Grandfather         CANCER HISTORY    Breast: Breast Cancer: Please answer Yes, No or Unsure. If Yes, please, identify which family member.: No    Ovarian: Ovarian Cancer: Please answer Yes, No or Unsure. If Yes, please, identify which family member.: No    Colon: Colon Cancer: Please answer Yes, No or Unsure. If Yes, please, identify which family member.: No    Endometrial: Endometrial Cancer: Please answer Yes, No or Unsure. If Yes, please, identify which family member.: No      FAMILY VTE HISTORY  Family History of Blood Clots: Blood Clots: Please answer Yes, No or Unsure. If Yes, please, identify which family member.: No      GENETIC HISTORY  Ethnic Background  Patient:    Partner:    Genetic Disease in Family  Patient: Patient: Defects and genetic syndromes? Please answer Yes, No or Unsure: No    Partner: Partner: Defects and genetic syndromes? Please answer Yes, No or Unsure: No    Birth Defects in Family  Patient: Patient: Birth defects? Please answer Yes, No or Unsure: No    Partner: Partner: Birth defects? Please answer Yes, No or Unsure: No    Genetic screening performed previously:       BMI:   BMI Readings from Last 1 Encounters:   24 31.34 kg/m²     VITALS:  /82   Pulse 74   Temp 36.8 °C (98.3 °F)   Ht 1.676 m (5' 6\")   Wt 88.1 kg (194 lb 3.2 oz)   LMP 2023 (Approximate)   BMI 31.34 kg/m²     ASSESSMENT   43 y.o.  female with  tubal facto infertility who is interested in fallopian tube reversal.  Partner SA:  ordered    COUNSELING  The patient's history was discussed at length, and all questions were answered to her satisfaction.  Risk, benefits, details and considerations surrounding fallopian tube reversal were discussed at length.  " "Different routes of surgery were also reviewed including open versus robotic approach.  The patient understands that there is an approximately 50 to 65% likelihood of success regarding becoming pregnant following this procedure.  She also understands that the success rate is \"all the time\" following surgery, and therefore patients who are younger have a higher likelihood of success since they have a longer interval between surgery and menopause.  As the patient desires to have 1 more child, the potential of proceeding to IVF was also reviewed, and details of that process were briefly discussed.  If the patient proceeds with fallopian tube reversal, she understands that there is a chance that 1 or both fallopian tubes may not be able to be repaired.  Our ability to complete this procedure will ultimately depend upon intraoperative findings, though we do recommend completing fertility workup to provide risk stratification.  All questions were answered to their satisfaction.    At this point, we will plan on proceeding with the following fertility work-up.  The patient is also to meet with our offices financial counselor to discuss cost and coverage for IVF and fallopian tube reversal.  We will reach out to the patient to facilitate obtaining her prior surgical records from her tubal ligation at [facility].  Once this work-up is complete, the patient and her partner to follow-up with me.    PLAN  Orders Placed This Encounter   Procedures    Hysterosalpingogram (HSG)    US pelvis transvaginal    FL hysterosalpingogram    Antimullerian Hormone (Amh)    TSH with reflex to Free T4 if abnormal    POCT pregnancy, urine manually resulted   [X ] partner to complete SA  [X ] consultation with financial counselor  [X ] obtain outside records from [facility] regarding operative and pathology report from delivery/BTL    PARTNER  Yes Semen Analysis: Ordered    FOLLOW UP   Consults: Financial consult  Chart to primary nurse for care " coordination and patient check list/education  Enroll in Engaged MD  Take prenatal vitamins, vitamin D 2000 IUs daily  Discussed that PAP and mammogram must be updated if appropriate based on age and clinical history and results received before treatment can begin  Discussed that treatment cannot proceed until checklist items are complete   6 week follow up with MD  Additional testing for BMI < 18 or > 40: No  Sperm Donor:      Ramone Vega  01/16/2024  2:47 PM

## 2024-01-20 ENCOUNTER — LAB (OUTPATIENT)
Dept: LAB | Facility: LAB | Age: 44
End: 2024-01-20
Payer: COMMERCIAL

## 2024-01-20 ENCOUNTER — HOSPITAL ENCOUNTER (OUTPATIENT)
Dept: RADIOLOGY | Facility: CLINIC | Age: 44
Discharge: HOME | End: 2024-01-20
Payer: COMMERCIAL

## 2024-01-20 DIAGNOSIS — Z31.41 FERTILITY TESTING: ICD-10-CM

## 2024-01-20 LAB — TSH SERPL-ACNC: 1.1 MIU/L (ref 0.44–3.98)

## 2024-01-20 PROCEDURE — 36415 COLL VENOUS BLD VENIPUNCTURE: CPT

## 2024-01-20 PROCEDURE — 83516 IMMUNOASSAY NONANTIBODY: CPT

## 2024-01-20 PROCEDURE — 76830 TRANSVAGINAL US NON-OB: CPT | Performed by: RADIOLOGY

## 2024-01-20 PROCEDURE — 76856 US EXAM PELVIC COMPLETE: CPT | Performed by: RADIOLOGY

## 2024-01-20 PROCEDURE — 84443 ASSAY THYROID STIM HORMONE: CPT

## 2024-01-20 PROCEDURE — 76856 US EXAM PELVIC COMPLETE: CPT

## 2024-01-24 ENCOUNTER — DOCUMENTATION (OUTPATIENT)
Dept: ENDOCRINOLOGY | Facility: CLINIC | Age: 44
End: 2024-01-24
Payer: COMMERCIAL

## 2024-01-24 NOTE — PROGRESS NOTES
Called patient and sent checklist via [employer] after npv with Dr. Vega. Patient to call with period for HSG. Patient states she did blood work already and had pelvic. Patient's partner to schedule SA. Patient made follow up visit with Dr. Vega for March. Patient aware that checklist items need to be completed prior to appointment. Reminded patient to get records from previous delivery sent to the office. Patient states she does not think there are records for her last delivery in 2018. Told patient to check with provider that did her delivery to see if op report from her delivery and tubal can be faxed to us. If patient is unable to obtain results she should discuss with Dr. Vega at her follow up appointment to see if he would still be able to proceed with surgery for her.   MAKSIM ANAYA on 1/24/24 at 11:22 AM.

## 2024-01-27 LAB — MIS SERPL-MCNC: 0.03 NG/ML

## 2024-02-06 ENCOUNTER — ANCILLARY PROCEDURE (OUTPATIENT)
Dept: ENDOCRINOLOGY | Facility: CLINIC | Age: 44
End: 2024-02-06
Payer: COMMERCIAL

## 2024-02-06 ENCOUNTER — HOSPITAL ENCOUNTER (OUTPATIENT)
Dept: RADIOLOGY | Facility: HOSPITAL | Age: 44
Discharge: HOME | End: 2024-02-06
Payer: COMMERCIAL

## 2024-02-06 DIAGNOSIS — Z31.41 FERTILITY TESTING: Primary | ICD-10-CM

## 2024-02-06 DIAGNOSIS — Z31.41 FERTILITY TESTING: ICD-10-CM

## 2024-02-06 DIAGNOSIS — Z01.812 ENCOUNTER FOR PREPROCEDURAL LABORATORY EXAMINATION: ICD-10-CM

## 2024-02-06 LAB — PREGNANCY TEST URINE, POC: NEGATIVE

## 2024-02-06 PROCEDURE — 74740 X-RAY FEMALE GENITAL TRACT: CPT

## 2024-02-06 PROCEDURE — 74740 X-RAY FEMALE GENITAL TRACT: CPT | Performed by: OBSTETRICS & GYNECOLOGY

## 2024-02-06 PROCEDURE — 81025 URINE PREGNANCY TEST: CPT | Performed by: NURSE PRACTITIONER

## 2024-02-06 PROCEDURE — 2550000001 HC RX 255 CONTRASTS: Performed by: OBSTETRICS & GYNECOLOGY

## 2024-02-06 PROCEDURE — 58340 CATHETER FOR HYSTEROGRAPHY: CPT | Performed by: NURSE PRACTITIONER

## 2024-02-06 RX ADMIN — IOHEXOL 20 ML: 240 INJECTION, SOLUTION INTRATHECAL; INTRAVASCULAR; INTRAVENOUS; ORAL at 14:00

## 2024-02-06 NOTE — PROGRESS NOTES
Patient ID: Radha Wallis is a 43 y.o. female.    HSG    Date/Time: 2/6/2024 1:57 PM    Performed by: DA Bond  Authorized by: DA Bond    Consent:     Consent obtained:  Verbal and written    Consent given by:  Patient    Risks, benefits, and alternatives were discussed: yes      Risks discussed:  Bleeding, infection and pain  Universal protocol:     Procedure explained and questions answered to patient or proxy's satisfaction: yes      Relevant documents present and verified: yes      Test results available: yes      Imaging studies available: yes      Required blood products, implants, devices, and special equipment available: yes      Immediately prior to procedure, a time out was called: yes      Patient identity confirmed:  Verbally with patient, arm band and hospital-assigned identification number  Indications:     Indications:  Fertility testing  Pre-procedure details:     Skin preparation:  Povidone-iodine  Sedation:     Sedation type:  None  Anesthesia:     Anesthesia method:  None  Procedure specific details:      Assistant: done by this FLORI      Hysterosalpingogram (HSG) risks, benefits, alternatives, and personnel discussed with patient who agreed to proceed.    Procedural time out        Done in room where procedure done: Yes        Done just before starting procedure: Yes                                                 All members of procedural team involved in time-out: Yes                  Active communication used: Yes                                                         All team members agreed on procedure: Yes                                        Patient correctly identified by two identifiers: Yes                                  Correct side and site identified: Yes                                                     All needed special equipment/instruments available: Yes               Prior to the start of the procedure a time out was taken and the following  were verified: The identity of the patient using two patient identifiers.   Urine pregnancy test was performed and was negative.   Risks, benefits, and alternatives of the procedure were explained to the patient.  Informed consent was obtained.     The patient was placed in the dorsal lithotomy position and a sterile speculum was placed in the vagina. The cervix was sterilized with Betadine x 3. The anterior lip of the cervix was grasped with a single-tooth tenaculum. The (balloon/acorn) cannula was then placed in the cervix and secured to the tenaculum. The patient was positioned and images were taken with fluoroscopy as dye was inserted through the cannula. All instruments were then removed. The patient tolerated the procedure well and was discharged home the same day without complications.    PRELIMINARY NURSE PRACTITIONER ASSESSMENT - FOR A FINAL REPORT, PLEASE REFER TO THE FINALIZED DOCUMENTATION IN THE IMAGING TAB   Uterus:  normal contour without filling defects  Tubes:  bilateral tubal fill to midsection. Consistent with tubal blockage due to tubal ligation.  Based on these findings, my recommendation is: Follow up required, chart forwarded to primary MD.    The patient was counseled regarding the above preliminary findings and understands these will be reviewed by the reading physician.   MERON SHERIFF on 2/6/24 at 1:58 PM.       Post-procedure details:     Procedure completion:  Tolerated well, no immediate complications

## 2024-02-20 ENCOUNTER — TELEMEDICINE (OUTPATIENT)
Dept: ENDOCRINOLOGY | Facility: CLINIC | Age: 44
End: 2024-02-20
Payer: COMMERCIAL

## 2024-02-20 VITALS — BODY MASS INDEX: 31.18 KG/M2 | HEIGHT: 66 IN | WEIGHT: 194 LBS

## 2024-02-20 DIAGNOSIS — Z31.41 FERTILITY TESTING: Primary | ICD-10-CM

## 2024-02-20 PROCEDURE — 99215 OFFICE O/P EST HI 40 MIN: CPT | Performed by: OBSTETRICS & GYNECOLOGY

## 2024-02-20 PROCEDURE — 1036F TOBACCO NON-USER: CPT | Performed by: OBSTETRICS & GYNECOLOGY

## 2024-02-20 ASSESSMENT — PATIENT HEALTH QUESTIONNAIRE - PHQ9
SUM OF ALL RESPONSES TO PHQ9 QUESTIONS 1 AND 2: 0
2. FEELING DOWN, DEPRESSED OR HOPELESS: NOT AT ALL
1. LITTLE INTEREST OR PLEASURE IN DOING THINGS: NOT AT ALL

## 2024-02-20 ASSESSMENT — PAIN SCALES - GENERAL: PAINLEVEL: 0-NO PAIN

## 2024-02-20 NOTE — PROGRESS NOTES
Virtual Visit    Follow Up Visit STEFANIE Wallis is a 43 y.o.  female who presents today for a follow up appointment.  She desires to proceed with fallopian tube reanastomosis.  The results of her fertility testing are as seen below.  The patient reports from her tubal ligation at [facility] in 2018 are not available.  She is uncertain if operative reports are available with her OB/GYN.  She will contact that physician to see if reports are able to be obtained.    Testing to date:     Component      Latest Ref Rng 2024   Anti-Mullerian Hormone      <=6.282 ng/mL 0.026    Thyroid Stimulating Hormone      0.44 - 3.98 mIU/L 1.10          Hysterosalpingogram: FL HYSTEROSALPINGOGRAM (2024):   Smooth uterine cavity with no demonstrated changes in endometrial  contour or filling defects noted. The fallopian tubes fill with  contrast to the mid isthmic portion with no further fill or spill,  consistent with history of bilateral tubal ligation. Normal tubal  architecture is noted in residual tube with significant length noted  on both sides and no hydrosalpinx.    US 2024:   1.  Thickening of the endometrial complex measuring 1.7 cm may  represent hyperplasia. Nonspecific trace hypoechoic fluid of the  endometrial canal.  2. Normal size of both ovaries with demonstrated blood flow.  3. Left ovarian 2.4 cm rounded heterogeneously hypoechoic lesion most  likely is a hemorrhagic or involuting cyst.    Partner SA:  ordered, not yet completed    PRIOR history:  The patient reports that she has had 7 pregnancies in the past, 6 of which were 1 prior partners and 1 was with her current partner.  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  Her most recent delivery was in 2018 by  section, and she had a bilateral tubal ligation performed at that time (performed at [facility]).  She is uncertain which method was  "used to perform tubal ligation, and records are not available from that procedure at this time.  She and her partner now report that they are interested in the possibility of having more children.  She does report having regular, monthly menses that occur 30-day intervals.     History reviewed. No pertinent past medical history.  Past Surgical History:   Procedure Laterality Date    OTHER SURGICAL HISTORY  2022     section    TUBAL LIGATION       Current Outpatient Medications on File Prior to Visit   Medication Sig Dispense Refill    famotidine (Pepcid) 20 mg tablet Take 1 tablet (20 mg) by mouth once daily.      loratadine (Claritin) 10 mg tablet Take 1 tablet (10 mg) by mouth once daily.       No current facility-administered medications on file prior to visit.       BMI:   BMI Readings from Last 1 Encounters:   24 31.31 kg/m²     VITALS:  Ht 1.676 m (5' 6\")   Wt 88 kg (194 lb)   LMP  (LMP Unknown)   BMI 31.31 kg/m²   LMP: No LMP recorded (lmp unknown).    This is a telehealth appointment    ASSESSMENT   Radha is a 43-year-old female who returns today to discuss fallopian tube reanastomosis.  Operative reports are not available from [facility], and the patient reports that she will reach out to her OB/GYN to see if they are able to be obtained.  HSG demonstrates fill of the fallopian tubes to the midportion without dilation or evidence of hydrosalpinx.  If operative reports are unable to be obtained and the patient desires to proceed with the procedure, she understands that our ability to complete the procedure for 1 or both tubes will ultimately depend upon the presence and quality of the distal fallopian tubes.  Prior to scheduling the procedure, however I would recommend having her partner complete semen analysis; if severe oligospermia is present fallopian tube anastomosis would not be expected to help her achieve pregnancy and IVF would be recommended.    Patient's ovarian reserve " findings were discussed, and all questions were answered to her satisfaction.  Her AMH level is consistent with diminished ovarian reserve, though this finding is not unexpected at the age of 43.  The patient is still having regular, monthly menses, and she understands that these laboratory findings will not accurately predict how long she has until menopause.  She does understand that her age is associated with diminished egg quality, and that the likelihood of success, even if tubal anastomosis is successful, is expected to be 5-6% or less per month.  Her cumulative likelihood of success will ultimately depend upon how long she continues to ovulate regularly.    Results of the patient's ultrasound were also discussed.  It is reported that the endometrial lining appeared thickened and that there may be a concern for hyperplasia.  Upon review of the images, her lining does appear to be consistent with luteal phase, and hemorrhagic cyst described on one of her ovaries is consistent with corpus luteal cyst.  It may be worth repeating ultrasound in the follicular phase for this patient to make sure her endometrial lining sheds appropriately following menses.  If her lining remains thickened in the follicular phase, endometrial biopsy would be reasonable.  Patient agrees with this plan.    PLAN   [X ] partner to complete semen analysis  [X ] patient to contact us with her next menstrual period to schedule repeat ultrasound in the follicular phase at our office  [X ] patient to follow-up once workup is complete to discuss options and next steps.    Orders Placed This Encounter   Procedures    US pelvis transvaginal       Ramone Vega  02/20/2024  1:11 PM

## 2024-02-25 ENCOUNTER — HOSPITAL ENCOUNTER (EMERGENCY)
Facility: HOSPITAL | Age: 44
Discharge: HOME | End: 2024-02-25
Attending: STUDENT IN AN ORGANIZED HEALTH CARE EDUCATION/TRAINING PROGRAM
Payer: COMMERCIAL

## 2024-02-25 ENCOUNTER — APPOINTMENT (OUTPATIENT)
Dept: CARDIOLOGY | Facility: HOSPITAL | Age: 44
End: 2024-02-25
Payer: COMMERCIAL

## 2024-02-25 VITALS
HEART RATE: 93 BPM | RESPIRATION RATE: 18 BRPM | WEIGHT: 180 LBS | BODY MASS INDEX: 28.93 KG/M2 | OXYGEN SATURATION: 97 % | TEMPERATURE: 99 F | HEIGHT: 66 IN | SYSTOLIC BLOOD PRESSURE: 150 MMHG | DIASTOLIC BLOOD PRESSURE: 83 MMHG

## 2024-02-25 DIAGNOSIS — T78.40XA ALLERGIC REACTION, INITIAL ENCOUNTER: Primary | ICD-10-CM

## 2024-02-25 PROCEDURE — 2500000004 HC RX 250 GENERAL PHARMACY W/ HCPCS (ALT 636 FOR OP/ED): Performed by: STUDENT IN AN ORGANIZED HEALTH CARE EDUCATION/TRAINING PROGRAM

## 2024-02-25 PROCEDURE — 93005 ELECTROCARDIOGRAM TRACING: CPT

## 2024-02-25 PROCEDURE — 99283 EMERGENCY DEPT VISIT LOW MDM: CPT | Mod: 25

## 2024-02-25 PROCEDURE — 2500000001 HC RX 250 WO HCPCS SELF ADMINISTERED DRUGS (ALT 637 FOR MEDICARE OP): Performed by: STUDENT IN AN ORGANIZED HEALTH CARE EDUCATION/TRAINING PROGRAM

## 2024-02-25 RX ORDER — PREDNISONE 20 MG/1
40 TABLET ORAL ONCE
Status: COMPLETED | OUTPATIENT
Start: 2024-02-25 | End: 2024-02-25

## 2024-02-25 RX ORDER — DIPHENHYDRAMINE HCL 25 MG
25 TABLET ORAL ONCE
Status: COMPLETED | OUTPATIENT
Start: 2024-02-25 | End: 2024-02-25

## 2024-02-25 RX ADMIN — PREDNISONE 40 MG: 20 TABLET ORAL at 18:30

## 2024-02-25 RX ADMIN — DIPHENHYDRAMINE HYDROCHLORIDE 25 MG: 25 TABLET ORAL at 18:30

## 2024-02-25 ASSESSMENT — PAIN DESCRIPTION - DESCRIPTORS: DESCRIPTORS: SQUEEZING

## 2024-02-25 ASSESSMENT — PAIN - FUNCTIONAL ASSESSMENT: PAIN_FUNCTIONAL_ASSESSMENT: 0-10

## 2024-02-25 ASSESSMENT — PAIN SCALES - GENERAL: PAINLEVEL_OUTOF10: 8

## 2024-02-25 ASSESSMENT — PAIN DESCRIPTION - LOCATION: LOCATION: CHEST

## 2024-02-26 NOTE — DISCHARGE INSTRUCTIONS
Please follow up with your Primary Care Provider (PCP) within the next 2-3 days regarding your ED visit. These documents have a lot of useful information! Please read them, so you know what to expect, what you can do for yourself at home, and also to know concerning signs warrant a return to the Emergency Department for additional evaluation.  You are welcome back any time. Thank you for entrusting your care to us, I hope we made your visit as pleasant as possible. Wishing you well!    Dr. Pelletier

## 2024-02-26 NOTE — ED PROVIDER NOTES
HPI   Chief Complaint   Patient presents with    Allergic Reaction       Patient is a 43-year-old female presenting to the emergency department due to concerns for allergic reaction.  Patient states that she is allergic to tree nuts and was eating something with peanuts in it but was concerned she was having some chest discomfort that she was having allergic reaction.  Patient took some Pepcid but did not take any Benadryl or use her EpiPen prior to coming to the emergency department.  On my evaluation the patient states that she started to feel improvement.  She denies any difficulty speaking, difficulty swallowing, difficulty breathing, vomiting, diarrhea, skin rashes.      History provided by:  Patient   used: No                        [place] Coma Scale Score: 15                     Patient History   No past medical history on file.  Past Surgical History:   Procedure Laterality Date    OTHER SURGICAL HISTORY  2022     section    TUBAL LIGATION       Family History   Problem Relation Name Age of Onset    Prostate cancer Paternal Grandfather       Social History     Tobacco Use    Smoking status: Never     Passive exposure: Never    Smokeless tobacco: Never   Substance Use Topics    Alcohol use: Yes    Drug use: Never       Physical Exam   ED Triage Vitals [24 1657]   Temperature Heart Rate Respirations BP   37.2 °C (99 °F) 93 18 150/83      Pulse Ox Temp src Heart Rate Source Patient Position   97 % -- -- --      BP Location FiO2 (%)     -- --       Physical Exam  Vitals and nursing note reviewed.   Constitutional:       General: She is not in acute distress.     Appearance: Normal appearance. She is not ill-appearing, toxic-appearing or diaphoretic.   HENT:      Head: Normocephalic and atraumatic.      Nose: Nose normal.      Mouth/Throat:      Mouth: Mucous membranes are moist.      Pharynx: No oropharyngeal exudate or posterior oropharyngeal erythema.   Eyes:       General: No scleral icterus.     Extraocular Movements: Extraocular movements intact.      Pupils: Pupils are equal, round, and reactive to light.   Cardiovascular:      Rate and Rhythm: Normal rate and regular rhythm.      Pulses: Normal pulses.      Heart sounds: Normal heart sounds. No murmur heard.     No friction rub. No gallop.   Pulmonary:      Effort: Pulmonary effort is normal. No respiratory distress.      Breath sounds: Normal breath sounds. No stridor. No wheezing, rhonchi or rales.   Chest:      Chest wall: No tenderness.   Abdominal:      General: Abdomen is flat. There is no distension.      Palpations: Abdomen is soft. There is no mass.      Tenderness: There is no abdominal tenderness. There is no guarding.      Hernia: No hernia is present.   Musculoskeletal:         General: No swelling, tenderness, deformity or signs of injury. Normal range of motion.      Cervical back: Normal range of motion and neck supple. No rigidity.   Skin:     General: Skin is warm and dry.      Capillary Refill: Capillary refill takes less than 2 seconds.      Coloration: Skin is not jaundiced or pale.      Findings: No bruising, erythema, lesion or rash.   Neurological:      General: No focal deficit present.      Mental Status: She is alert and oriented to person, place, and time. Mental status is at baseline.   Psychiatric:         Mood and Affect: Mood normal.         Behavior: Behavior normal.         ED Course & MDM   Diagnoses as of 02/25/24 1918   Allergic reaction, initial encounter       Medical Decision Making  Patient is a 43-year-old female presenting to the emergency department due to concerns for allergic reaction.  Patient does not have any evidence of anaphylaxis.  Patient appears well on physical exam.  Patient was given a dose of Benadryl and prednisone and observed for approximately 2 hours.  On reevaluation the patient states that she is feeling much better.  Patient was advised to continue to  monitor symptoms and return to the emergency department with any worsening allergic reaction or concerns for anaphylaxis.  Patient verbalized understanding of all structures given to her.  Patient was appreciative care and agreeable discharge.        Procedure  Procedures     Praveen Pelletier DO  02/25/24 1918

## 2024-02-29 LAB
ATRIAL RATE: 89 BPM
P AXIS: 52 DEGREES
P OFFSET: 206 MS
P ONSET: 152 MS
PR INTERVAL: 136 MS
Q ONSET: 220 MS
QRS COUNT: 15 BEATS
QRS DURATION: 72 MS
QT INTERVAL: 356 MS
QTC CALCULATION(BAZETT): 433 MS
QTC FREDERICIA: 405 MS
R AXIS: 20 DEGREES
T AXIS: -16 DEGREES
T OFFSET: 398 MS
VENTRICULAR RATE: 89 BPM

## 2024-03-01 ENCOUNTER — TELEPHONE (OUTPATIENT)
Dept: ENDOCRINOLOGY | Facility: CLINIC | Age: 44
End: 2024-03-01
Payer: COMMERCIAL

## 2024-03-01 NOTE — TELEPHONE ENCOUNTER
Reason for call: Patient calling to talk to Dr Vega she wants to proceed with her surgery.  Notes:Please call her she wants to talk to a nurse.    Phone call to patient. She states that she understands Dr. Vega wanted her to repeat some testing prior to surgery but she is interested in just moving forward. She states that she has insurance coverage for the procedure until May 1 and is interested in scheduling. Will send a staff message to Dr. Vega to see if she can get scheduled.   Nesha Pollock RNC  03/01/24   1:52 PM

## 2024-03-03 ENCOUNTER — DOCUMENTATION (OUTPATIENT)
Dept: ENDOCRINOLOGY | Facility: CLINIC | Age: 44
End: 2024-03-03
Payer: COMMERCIAL

## 2024-03-03 ENCOUNTER — PREP FOR PROCEDURE (OUTPATIENT)
Dept: ENDOCRINOLOGY | Facility: CLINIC | Age: 44
End: 2024-03-03
Payer: COMMERCIAL

## 2024-03-03 DIAGNOSIS — N97.1 TUBAL OCCLUSION: Primary | ICD-10-CM

## 2024-03-03 RX ORDER — GABAPENTIN 600 MG/1
600 TABLET ORAL ONCE
Status: CANCELLED | OUTPATIENT
Start: 2024-03-03 | End: 2024-03-03

## 2024-03-03 RX ORDER — ACETAMINOPHEN 325 MG/1
975 TABLET ORAL ONCE
Status: CANCELLED | OUTPATIENT
Start: 2024-03-03 | End: 2024-03-03

## 2024-03-03 RX ORDER — CELECOXIB 400 MG/1
400 CAPSULE ORAL ONCE
Status: CANCELLED | OUTPATIENT
Start: 2024-03-03 | End: 2024-03-03

## 2024-03-03 NOTE — PROGRESS NOTES
I contacted the patient by phone to further discuss fallopian tube reversal.  She had previously called in requesting to proceed with the procedure, forgo additional testing (US and SA).  She understands the risks of undiagnosed male factor infertility and structural issues that may impact our ability to complete the procedure, reports that she would still like to move forward with surgery.  I will submit orders and a booking sheet to our , have her follow up for a preoperative appointment once the procedure date is set.  All questions were answered to her satisfaction.    Ramone Vega MD  Reproductive Endocrinology and Infertility   [facility]

## 2024-03-11 ENCOUNTER — APPOINTMENT (OUTPATIENT)
Dept: ENDOCRINOLOGY | Facility: CLINIC | Age: 44
End: 2024-03-11
Payer: COMMERCIAL

## 2025-01-23 ENCOUNTER — TELEPHONE (OUTPATIENT)
Dept: ENDOCRINOLOGY | Facility: CLINIC | Age: 45
End: 2025-01-23
Payer: COMMERCIAL

## 2025-01-23 NOTE — TELEPHONE ENCOUNTER
Returned patient call, no answer, went to voicemail, unable to leave voicemail as voicemail is full.  Janina Burns 01/23/25 4:16 PM

## 2025-01-24 ENCOUNTER — TELEPHONE (OUTPATIENT)
Dept: ENDOCRINOLOGY | Facility: CLINIC | Age: 45
End: 2025-01-24
Payer: COMMERCIAL

## 2025-01-24 NOTE — TELEPHONE ENCOUNTER
Returned patient call regarding follow up with Dr. Vega prior to surgery. Patient last spoke with Dr. Vega in march of 2024. Per Dr. Vega, patient is to have formal virtual follow up to touch base and discuss next steps. Patient agreeable and denies further questions/concerns. Patient transferred to front to schedule appointment.   MIKAYLA LINDSAY on 1/24/25 at 12:06 PM.

## 2025-04-09 ENCOUNTER — TELEMEDICINE (OUTPATIENT)
Dept: ENDOCRINOLOGY | Facility: CLINIC | Age: 45
End: 2025-04-09
Payer: COMMERCIAL

## 2025-04-09 VITALS — BODY MASS INDEX: 28.93 KG/M2 | WEIGHT: 180 LBS | HEIGHT: 66 IN

## 2025-04-09 DIAGNOSIS — N97.1 TUBAL OCCLUSION: Primary | ICD-10-CM

## 2025-04-09 PROCEDURE — 99215 OFFICE O/P EST HI 40 MIN: CPT | Performed by: OBSTETRICS & GYNECOLOGY

## 2025-04-09 ASSESSMENT — PAIN SCALES - GENERAL: PAINLEVEL_OUTOF10: 0-NO PAIN

## 2025-04-09 NOTE — PROGRESS NOTES
Virtual or Telephone Consent: An interactive audio and video telecommunication system which permits real time communications between the patient (at the originating site) and provider (at the distant site) was utilized to provide this telehealth service  N/A    Follow Up Visit STEFANIE Wallis is a 44 y.o.  female who presents today for a follow up appointment.  She desires to proceed with fallopian tube reanastomosis.  The results of her fertility testing are as seen below.  The patient reports from her tubal ligation at [facility] in 2018 are not available.  She is uncertain if operative reports are available with her OB/GYN.  She will contact that physician to see if reports are able to be obtained.    She denies having any changes to her health history since she was last seen in our clinic.  She has continued to have regular, monthly menses and has had no issues with irregular bleeding.     Testing to date:      Component      Latest Ref Rng 2024   Anti-Mullerian Hormone      <=6.282 ng/mL 0.026    Thyroid Stimulating Hormone      0.44 - 3.98 mIU/L 1.10          Hysterosalpingogram: FL HYSTEROSALPINGOGRAM (2024):   Smooth uterine cavity with no demonstrated changes in endometrial  contour or filling defects noted. The fallopian tubes fill with  contrast to the mid isthmic portion with no further fill or spill,  consistent with history of bilateral tubal ligation. Normal tubal  architecture is noted in residual tube with significant length noted  on both sides and no hydrosalpinx.     US 2024:   1.  Endometrial complex measuring 1.7 cm   2. Normal size of both ovaries with demonstrated blood flow.  3. Left ovarian 2.4 cm rounded heterogeneously hypoechoic lesion most likely is a hemorrhagic or involuting cyst.     Patient would like to forgo additional testing (updated lab work, repeat US, and SA). She understands the risks of undiagnosed male factor infertility and structural issues that  "may impact our ability to complete the procedure, reports that she would still like to move forward with surgery.       History reviewed. No pertinent past medical history.  Past Surgical History:   Procedure Laterality Date    OTHER SURGICAL HISTORY  2022     section    TUBAL LIGATION       Current Outpatient Medications on File Prior to Visit   Medication Sig Dispense Refill    famotidine (Pepcid) 20 mg tablet Take 1 tablet (20 mg) by mouth once daily.      loratadine (Claritin) 10 mg tablet Take 1 tablet (10 mg) by mouth once daily.       No current facility-administered medications on file prior to visit.       BMI:   BMI Readings from Last 1 Encounters:   25 29.05 kg/m²     VITALS:  Ht 1.676 m (5' 6\")   Wt 81.6 kg (180 lb)   LMP 2025 (Approximate)   BMI 29.05 kg/m²   LMP: Patient's last menstrual period was 2025 (approximate).    This is a telehealth appointment    ASSESSMENT     Radha is a 44-year-old female who returns today to discuss fallopian tube reanastomosis. Operative reports are not available from [facility], and the patient reports that she will reach out to her OB/GYN to see if they are able to be obtained. HSG demonstrates fill of the fallopian tubes to the midportion without dilation or evidence of hydrosalpinx. If operative reports are unable to be obtained and the patient desires to proceed with the procedure, she understands that our ability to complete the procedure for 1 or both tubes will ultimately depend upon the presence and quality of the distal fallopian tubes. She verbalizes understanding of this.  Possibility of proceeding with IVF was also discussed.  Patient reports that she is interested in fallopian tube reanastomosis.    Her ovarian reserve findings were also discussed, and all questions were answered to her satisfaction.  Her AMH level is consistent with diminished ovarian reserve, though this finding is not unexpected at the age of 43 (when " testing was last checked).  The patient was offered the opportunity to update ovarian reserve testing as 1 year has passed since prior evaluation, however she declined.  The patient is still having regular, monthly menses, and she understands that these laboratory findings will not accurately predict how long she has until menopause.  She does understand that her age is associated with diminished egg quality, and that the likelihood of success, even if tubal anastomosis is successful, is expected to be 5-6% or less per month.  Her cumulative likelihood of success will ultimately depend upon how long she continues to ovulate regularly.     Routine Testing  Fertility Center  STDs Within 1 year   Genetic carrier Waiver/Completed   T&S Within 1 year   AMH Within 1 year   TSH Within 1 year   Rubella/Varicella Within 5 years     BMI Testing  Fertility Center  CBC Within 1 year   CMP Within 1 year   HgbA1c Within 1 year   Mag, Phos, Vit D <18 Within 1 year   MFM > 40  REQ   Wt loss consult > 40 OPT     PLAN    [X ] Patient to continue attempts to obtain operative report and pathology from her tubal ligation procedure at [facility]  [X ] I will submit orders and a booking sheet to our , have her follow up for a preoperative appointment once the procedure date is set. All questions were answered to her satisfaction.       FOLLOW UP   As above    Fertility Plan Update: tubal reanastomosis    Intimate Exam Performed: No, an intimate exam was not performed at this encounter.     Ramone Vega  04/09/2025  11:39 AM

## 2025-04-10 ENCOUNTER — PREP FOR PROCEDURE (OUTPATIENT)
Dept: ENDOCRINOLOGY | Facility: CLINIC | Age: 45
End: 2025-04-10
Payer: COMMERCIAL

## 2025-04-10 DIAGNOSIS — N97.1 TUBAL OCCLUSION: Primary | ICD-10-CM

## 2025-04-10 RX ORDER — GABAPENTIN 600 MG/1
600 TABLET ORAL ONCE
OUTPATIENT
Start: 2025-04-10 | End: 2025-04-10

## 2025-04-10 RX ORDER — CELECOXIB 200 MG/1
400 CAPSULE ORAL ONCE
OUTPATIENT
Start: 2025-04-10 | End: 2025-04-10

## 2025-04-10 RX ORDER — ACETAMINOPHEN 325 MG/1
975 TABLET ORAL ONCE
OUTPATIENT
Start: 2025-04-10 | End: 2025-04-10

## 2025-04-15 PROBLEM — N97.1 TUBAL OCCLUSION: Status: ACTIVE | Noted: 2025-04-10

## 2025-05-20 ENCOUNTER — LAB (OUTPATIENT)
Dept: LAB | Facility: HOSPITAL | Age: 45
End: 2025-05-20
Payer: COMMERCIAL

## 2025-05-20 DIAGNOSIS — N97.1 FEMALE INFERTILITY OF TUBAL ORIGIN: Primary | ICD-10-CM

## 2025-05-20 LAB
ANION GAP SERPL CALC-SCNC: 12 MMOL/L (ref 10–20)
BUN SERPL-MCNC: 10 MG/DL (ref 6–23)
CALCIUM SERPL-MCNC: 9.3 MG/DL (ref 8.6–10.3)
CHLORIDE SERPL-SCNC: 104 MMOL/L (ref 98–107)
CO2 SERPL-SCNC: 26 MMOL/L (ref 21–32)
CREAT SERPL-MCNC: 0.66 MG/DL (ref 0.5–1.05)
EGFRCR SERPLBLD CKD-EPI 2021: >90 ML/MIN/1.73M*2
ERYTHROCYTE [DISTWIDTH] IN BLOOD BY AUTOMATED COUNT: 15.8 % (ref 11.5–14.5)
GLUCOSE SERPL-MCNC: 85 MG/DL (ref 74–99)
HCT VFR BLD AUTO: 36.1 % (ref 36–46)
HGB BLD-MCNC: 11.3 G/DL (ref 12–16)
MCH RBC QN AUTO: 25.2 PG (ref 26–34)
MCHC RBC AUTO-ENTMCNC: 31.3 G/DL (ref 32–36)
MCV RBC AUTO: 81 FL (ref 80–100)
NRBC BLD-RTO: 0 /100 WBCS (ref 0–0)
PLATELET # BLD AUTO: 243 X10*3/UL (ref 150–450)
POTASSIUM SERPL-SCNC: 3.7 MMOL/L (ref 3.5–5.3)
RBC # BLD AUTO: 4.48 X10*6/UL (ref 4–5.2)
SODIUM SERPL-SCNC: 138 MMOL/L (ref 136–145)
WBC # BLD AUTO: 7.2 X10*3/UL (ref 4.4–11.3)

## 2025-05-20 PROCEDURE — 80048 BASIC METABOLIC PNL TOTAL CA: CPT

## 2025-05-20 PROCEDURE — 86850 RBC ANTIBODY SCREEN: CPT

## 2025-05-20 PROCEDURE — 85027 COMPLETE CBC AUTOMATED: CPT

## 2025-05-20 PROCEDURE — 86901 BLOOD TYPING SEROLOGIC RH(D): CPT

## 2025-05-20 PROCEDURE — 36415 COLL VENOUS BLD VENIPUNCTURE: CPT

## 2025-05-20 PROCEDURE — 86900 BLOOD TYPING SEROLOGIC ABO: CPT

## 2025-05-21 ENCOUNTER — ANESTHESIA EVENT (OUTPATIENT)
Dept: OPERATING ROOM | Facility: HOSPITAL | Age: 45
End: 2025-05-21
Payer: COMMERCIAL

## 2025-05-21 LAB
ABO GROUP (TYPE) IN BLOOD: NORMAL
ANTIBODY SCREEN: NORMAL
RH FACTOR (ANTIGEN D): NORMAL

## 2025-05-22 ENCOUNTER — PHARMACY VISIT (OUTPATIENT)
Dept: PHARMACY | Facility: CLINIC | Age: 45
End: 2025-05-22
Payer: COMMERCIAL

## 2025-05-22 ENCOUNTER — HOSPITAL ENCOUNTER (OUTPATIENT)
Facility: HOSPITAL | Age: 45
Setting detail: OUTPATIENT SURGERY
Discharge: HOME | End: 2025-05-22
Attending: OBSTETRICS & GYNECOLOGY | Admitting: OBSTETRICS & GYNECOLOGY
Payer: COMMERCIAL

## 2025-05-22 ENCOUNTER — ANESTHESIA (OUTPATIENT)
Dept: OPERATING ROOM | Facility: HOSPITAL | Age: 45
End: 2025-05-22
Payer: COMMERCIAL

## 2025-05-22 VITALS
TEMPERATURE: 97.7 F | HEART RATE: 74 BPM | SYSTOLIC BLOOD PRESSURE: 108 MMHG | HEIGHT: 66 IN | OXYGEN SATURATION: 96 % | DIASTOLIC BLOOD PRESSURE: 60 MMHG | RESPIRATION RATE: 18 BRPM | BODY MASS INDEX: 28.93 KG/M2 | WEIGHT: 180 LBS

## 2025-05-22 DIAGNOSIS — Z98.890 POST-OPERATIVE STATE: Primary | ICD-10-CM

## 2025-05-22 DIAGNOSIS — N97.1 TUBAL OCCLUSION: ICD-10-CM

## 2025-05-22 LAB — HCG UR QL IA.RAPID: NEGATIVE

## 2025-05-22 PROCEDURE — 3700000001 HC GENERAL ANESTHESIA TIME - INITIAL BASE CHARGE: Performed by: OBSTETRICS & GYNECOLOGY

## 2025-05-22 PROCEDURE — 88305 TISSUE EXAM BY PATHOLOGIST: CPT | Performed by: PATHOLOGY

## 2025-05-22 PROCEDURE — 58750 REPAIR OVIDUCT: CPT | Performed by: OBSTETRICS & GYNECOLOGY

## 2025-05-22 PROCEDURE — 7100000002 HC RECOVERY ROOM TIME - EACH INCREMENTAL 1 MINUTE: Performed by: OBSTETRICS & GYNECOLOGY

## 2025-05-22 PROCEDURE — 7100000010 HC PHASE TWO TIME - EACH INCREMENTAL 1 MINUTE: Performed by: OBSTETRICS & GYNECOLOGY

## 2025-05-22 PROCEDURE — 7100000009 HC PHASE TWO TIME - INITIAL BASE CHARGE: Performed by: OBSTETRICS & GYNECOLOGY

## 2025-05-22 PROCEDURE — RXMED WILLOW AMBULATORY MEDICATION CHARGE

## 2025-05-22 PROCEDURE — A58750: Performed by: ANESTHESIOLOGY

## 2025-05-22 PROCEDURE — 2500000004 HC RX 250 GENERAL PHARMACY W/ HCPCS (ALT 636 FOR OP/ED): Mod: JZ | Performed by: ANESTHESIOLOGIST ASSISTANT

## 2025-05-22 PROCEDURE — 2500000004 HC RX 250 GENERAL PHARMACY W/ HCPCS (ALT 636 FOR OP/ED): Mod: JZ | Performed by: OBSTETRICS & GYNECOLOGY

## 2025-05-22 PROCEDURE — 81025 URINE PREGNANCY TEST: CPT | Performed by: OBSTETRICS & GYNECOLOGY

## 2025-05-22 PROCEDURE — 2500000001 HC RX 250 WO HCPCS SELF ADMINISTERED DRUGS (ALT 637 FOR MEDICARE OP): Performed by: ANESTHESIOLOGY

## 2025-05-22 PROCEDURE — 2720000007 HC OR 272 NO HCPCS: Performed by: OBSTETRICS & GYNECOLOGY

## 2025-05-22 PROCEDURE — 3600000008 HC OR TIME - EACH INCREMENTAL 1 MINUTE - PROCEDURE LEVEL THREE: Performed by: OBSTETRICS & GYNECOLOGY

## 2025-05-22 PROCEDURE — 7100000001 HC RECOVERY ROOM TIME - INITIAL BASE CHARGE: Performed by: OBSTETRICS & GYNECOLOGY

## 2025-05-22 PROCEDURE — 2500000005 HC RX 250 GENERAL PHARMACY W/O HCPCS: Performed by: ANESTHESIOLOGY

## 2025-05-22 PROCEDURE — 3600000003 HC OR TIME - INITIAL BASE CHARGE - PROCEDURE LEVEL THREE: Performed by: OBSTETRICS & GYNECOLOGY

## 2025-05-22 PROCEDURE — A58750: Performed by: ANESTHESIOLOGIST ASSISTANT

## 2025-05-22 PROCEDURE — 2500000001 HC RX 250 WO HCPCS SELF ADMINISTERED DRUGS (ALT 637 FOR MEDICARE OP): Performed by: OBSTETRICS & GYNECOLOGY

## 2025-05-22 PROCEDURE — 2500000005 HC RX 250 GENERAL PHARMACY W/O HCPCS: Performed by: ANESTHESIOLOGIST ASSISTANT

## 2025-05-22 PROCEDURE — 3700000002 HC GENERAL ANESTHESIA TIME - EACH INCREMENTAL 1 MINUTE: Performed by: OBSTETRICS & GYNECOLOGY

## 2025-05-22 PROCEDURE — 88305 TISSUE EXAM BY PATHOLOGIST: CPT | Mod: TC,STJLAB,WESLAB | Performed by: OBSTETRICS & GYNECOLOGY

## 2025-05-22 RX ORDER — ROCURONIUM BROMIDE 50 MG/5 ML
SYRINGE (ML) INTRAVENOUS AS NEEDED
Status: DISCONTINUED | OUTPATIENT
Start: 2025-05-22 | End: 2025-05-22

## 2025-05-22 RX ORDER — ONDANSETRON 4 MG/1
4 TABLET, FILM COATED ORAL EVERY 6 HOURS PRN
Qty: 20 TABLET | Refills: 0 | Status: SHIPPED | OUTPATIENT
Start: 2025-05-22

## 2025-05-22 RX ORDER — ACETAMINOPHEN 325 MG/1
975 TABLET ORAL ONCE
Status: COMPLETED | OUTPATIENT
Start: 2025-05-22 | End: 2025-05-22

## 2025-05-22 RX ORDER — ONDANSETRON HYDROCHLORIDE 2 MG/ML
4 INJECTION, SOLUTION INTRAVENOUS ONCE AS NEEDED
Status: DISCONTINUED | OUTPATIENT
Start: 2025-05-22 | End: 2025-05-22 | Stop reason: HOSPADM

## 2025-05-22 RX ORDER — NORETHINDRONE AND ETHINYL ESTRADIOL 0.5-0.035
KIT ORAL AS NEEDED
Status: DISCONTINUED | OUTPATIENT
Start: 2025-05-22 | End: 2025-05-22

## 2025-05-22 RX ORDER — FENTANYL CITRATE 50 UG/ML
INJECTION, SOLUTION INTRAMUSCULAR; INTRAVENOUS AS NEEDED
Status: DISCONTINUED | OUTPATIENT
Start: 2025-05-22 | End: 2025-05-22

## 2025-05-22 RX ORDER — MEPERIDINE HYDROCHLORIDE 50 MG/ML
12.5 INJECTION INTRAMUSCULAR; INTRAVENOUS; SUBCUTANEOUS EVERY 10 MIN PRN
Status: DISCONTINUED | OUTPATIENT
Start: 2025-05-22 | End: 2025-05-22 | Stop reason: HOSPADM

## 2025-05-22 RX ORDER — IBUPROFEN 600 MG/1
600 TABLET, FILM COATED ORAL EVERY 6 HOURS PRN
Qty: 20 TABLET | Refills: 0 | Status: SHIPPED | OUTPATIENT
Start: 2025-05-22

## 2025-05-22 RX ORDER — MIDAZOLAM HYDROCHLORIDE 1 MG/ML
INJECTION, SOLUTION INTRAMUSCULAR; INTRAVENOUS AS NEEDED
Status: DISCONTINUED | OUTPATIENT
Start: 2025-05-22 | End: 2025-05-22

## 2025-05-22 RX ORDER — LIDOCAINE HYDROCHLORIDE 20 MG/ML
INJECTION, SOLUTION EPIDURAL; INFILTRATION; INTRACAUDAL; PERINEURAL AS NEEDED
Status: DISCONTINUED | OUTPATIENT
Start: 2025-05-22 | End: 2025-05-22

## 2025-05-22 RX ORDER — DIPHENHYDRAMINE HYDROCHLORIDE 50 MG/ML
12.5 INJECTION, SOLUTION INTRAMUSCULAR; INTRAVENOUS ONCE AS NEEDED
Status: DISCONTINUED | OUTPATIENT
Start: 2025-05-22 | End: 2025-05-22 | Stop reason: HOSPADM

## 2025-05-22 RX ORDER — POLYETHYLENE GLYCOL 3350 17 G/17G
17 POWDER, FOR SOLUTION ORAL DAILY PRN
Qty: 10 PACKET | Refills: 0 | Status: SHIPPED | OUTPATIENT
Start: 2025-05-22

## 2025-05-22 RX ORDER — ALBUTEROL SULFATE 0.83 MG/ML
2.5 SOLUTION RESPIRATORY (INHALATION) ONCE AS NEEDED
Status: DISCONTINUED | OUTPATIENT
Start: 2025-05-22 | End: 2025-05-22 | Stop reason: HOSPADM

## 2025-05-22 RX ORDER — OXYCODONE HYDROCHLORIDE 5 MG/1
5 TABLET ORAL EVERY 6 HOURS PRN
Qty: 10 TABLET | Refills: 0 | Status: SHIPPED | OUTPATIENT
Start: 2025-05-22

## 2025-05-22 RX ORDER — HYDROMORPHONE HYDROCHLORIDE 1 MG/ML
INJECTION, SOLUTION INTRAMUSCULAR; INTRAVENOUS; SUBCUTANEOUS AS NEEDED
Status: DISCONTINUED | OUTPATIENT
Start: 2025-05-22 | End: 2025-05-22

## 2025-05-22 RX ORDER — SODIUM CHLORIDE, SODIUM LACTATE, POTASSIUM CHLORIDE, CALCIUM CHLORIDE 600; 310; 30; 20 MG/100ML; MG/100ML; MG/100ML; MG/100ML
100 INJECTION, SOLUTION INTRAVENOUS CONTINUOUS
Status: DISCONTINUED | OUTPATIENT
Start: 2025-05-22 | End: 2025-05-22 | Stop reason: HOSPADM

## 2025-05-22 RX ORDER — FENTANYL CITRATE 50 UG/ML
12.5 INJECTION, SOLUTION INTRAMUSCULAR; INTRAVENOUS EVERY 5 MIN PRN
Status: DISCONTINUED | OUTPATIENT
Start: 2025-05-22 | End: 2025-05-22 | Stop reason: HOSPADM

## 2025-05-22 RX ORDER — BUPIVACAINE HYDROCHLORIDE 5 MG/ML
INJECTION, SOLUTION EPIDURAL; INTRACAUDAL; PERINEURAL AS NEEDED
Status: DISCONTINUED | OUTPATIENT
Start: 2025-05-22 | End: 2025-05-22 | Stop reason: HOSPADM

## 2025-05-22 RX ORDER — CELECOXIB 200 MG/1
400 CAPSULE ORAL ONCE
Status: COMPLETED | OUTPATIENT
Start: 2025-05-22 | End: 2025-05-22

## 2025-05-22 RX ORDER — LIDOCAINE HYDROCHLORIDE 10 MG/ML
0.1 INJECTION, SOLUTION INFILTRATION; PERINEURAL ONCE
Status: DISCONTINUED | OUTPATIENT
Start: 2025-05-22 | End: 2025-05-22 | Stop reason: HOSPADM

## 2025-05-22 RX ORDER — PHENYLEPHRINE 10 MG/250 ML(40 MCG/ML)IN 0.9 % SOD.CHLORIDE INTRAVENOUS
CONTINUOUS PRN
Status: DISCONTINUED | OUTPATIENT
Start: 2025-05-22 | End: 2025-05-22

## 2025-05-22 RX ORDER — ACETAMINOPHEN 325 MG/1
975 TABLET ORAL ONCE
Status: DISCONTINUED | OUTPATIENT
Start: 2025-05-22 | End: 2025-05-22 | Stop reason: HOSPADM

## 2025-05-22 RX ORDER — VASOPRESSIN 20 [USP'U]/ML
INJECTION, SOLUTION INTRAVENOUS CONTINUOUS PRN
Status: COMPLETED | OUTPATIENT
Start: 2025-05-22 | End: 2025-05-22

## 2025-05-22 RX ORDER — HYDRALAZINE HYDROCHLORIDE 20 MG/ML
5 INJECTION INTRAMUSCULAR; INTRAVENOUS EVERY 30 MIN PRN
Status: DISCONTINUED | OUTPATIENT
Start: 2025-05-22 | End: 2025-05-22 | Stop reason: HOSPADM

## 2025-05-22 RX ORDER — ONDANSETRON HYDROCHLORIDE 2 MG/ML
INJECTION, SOLUTION INTRAVENOUS AS NEEDED
Status: DISCONTINUED | OUTPATIENT
Start: 2025-05-22 | End: 2025-05-22

## 2025-05-22 RX ORDER — OXYCODONE HYDROCHLORIDE 5 MG/1
5 TABLET ORAL EVERY 4 HOURS PRN
Status: DISCONTINUED | OUTPATIENT
Start: 2025-05-22 | End: 2025-05-22 | Stop reason: HOSPADM

## 2025-05-22 RX ORDER — MIDAZOLAM HYDROCHLORIDE 1 MG/ML
1 INJECTION, SOLUTION INTRAMUSCULAR; INTRAVENOUS ONCE AS NEEDED
Status: DISCONTINUED | OUTPATIENT
Start: 2025-05-22 | End: 2025-05-22 | Stop reason: HOSPADM

## 2025-05-22 RX ORDER — ACETAMINOPHEN 325 MG/1
650 TABLET ORAL EVERY 6 HOURS PRN
Qty: 20 TABLET | Refills: 0 | Status: SHIPPED | OUTPATIENT
Start: 2025-05-22

## 2025-05-22 RX ORDER — HYDROMORPHONE HYDROCHLORIDE 1 MG/ML
1 INJECTION, SOLUTION INTRAMUSCULAR; INTRAVENOUS; SUBCUTANEOUS EVERY 5 MIN PRN
Status: DISCONTINUED | OUTPATIENT
Start: 2025-05-22 | End: 2025-05-22 | Stop reason: HOSPADM

## 2025-05-22 RX ORDER — PROPOFOL 10 MG/ML
INJECTION, EMULSION INTRAVENOUS CONTINUOUS PRN
Status: DISCONTINUED | OUTPATIENT
Start: 2025-05-22 | End: 2025-05-22

## 2025-05-22 RX ORDER — METHYLENE BLUE 10 MG/ML
INJECTION INTRAVENOUS AS NEEDED
Status: DISCONTINUED | OUTPATIENT
Start: 2025-05-22 | End: 2025-05-22 | Stop reason: HOSPADM

## 2025-05-22 RX ORDER — PHENYLEPHRINE HCL IN 0.9% NACL 1 MG/10 ML
SYRINGE (ML) INTRAVENOUS AS NEEDED
Status: DISCONTINUED | OUTPATIENT
Start: 2025-05-22 | End: 2025-05-22

## 2025-05-22 RX ORDER — GABAPENTIN 600 MG/1
600 TABLET ORAL ONCE
Status: COMPLETED | OUTPATIENT
Start: 2025-05-22 | End: 2025-05-22

## 2025-05-22 RX ADMIN — EPHEDRINE SULFATE 5 MG: 50 INJECTION, SOLUTION INTRAVENOUS at 08:56

## 2025-05-22 RX ADMIN — EPHEDRINE SULFATE 5 MG: 50 INJECTION, SOLUTION INTRAVENOUS at 11:07

## 2025-05-22 RX ADMIN — Medication 10 MG: at 09:27

## 2025-05-22 RX ADMIN — GABAPENTIN 600 MG: 600 TABLET, FILM COATED ORAL at 06:47

## 2025-05-22 RX ADMIN — FENTANYL CITRATE 100 MCG: 50 INJECTION, SOLUTION INTRAMUSCULAR; INTRAVENOUS at 07:40

## 2025-05-22 RX ADMIN — PROPOFOL 20 MG: 10 INJECTION, EMULSION INTRAVENOUS at 11:13

## 2025-05-22 RX ADMIN — Medication 50 MCG: at 08:33

## 2025-05-22 RX ADMIN — Medication 3 L/MIN: at 11:25

## 2025-05-22 RX ADMIN — EPHEDRINE SULFATE 5 MG: 50 INJECTION, SOLUTION INTRAVENOUS at 08:10

## 2025-05-22 RX ADMIN — Medication 10 MG: at 10:18

## 2025-05-22 RX ADMIN — EPHEDRINE SULFATE 5 MG: 50 INJECTION, SOLUTION INTRAVENOUS at 09:03

## 2025-05-22 RX ADMIN — Medication 10 MG: at 10:10

## 2025-05-22 RX ADMIN — OXYCODONE HYDROCHLORIDE 5 MG: 5 TABLET ORAL at 12:49

## 2025-05-22 RX ADMIN — Medication 50 MG: at 07:41

## 2025-05-22 RX ADMIN — MIDAZOLAM 2 MG: 1 INJECTION INTRAMUSCULAR; INTRAVENOUS at 07:29

## 2025-05-22 RX ADMIN — ONDANSETRON 4 MG: 2 INJECTION INTRAMUSCULAR; INTRAVENOUS at 10:58

## 2025-05-22 RX ADMIN — FENTANYL CITRATE 25 MCG: 50 INJECTION, SOLUTION INTRAMUSCULAR; INTRAVENOUS at 09:29

## 2025-05-22 RX ADMIN — Medication 0.2 MCG/KG/MIN: at 09:07

## 2025-05-22 RX ADMIN — SUGAMMADEX 200 MG: 100 INJECTION, SOLUTION INTRAVENOUS at 11:12

## 2025-05-22 RX ADMIN — FENTANYL CITRATE 25 MCG: 50 INJECTION, SOLUTION INTRAMUSCULAR; INTRAVENOUS at 08:20

## 2025-05-22 RX ADMIN — PROPOFOL 30 MCG/KG/MIN: 10 INJECTION, EMULSION INTRAVENOUS at 08:18

## 2025-05-22 RX ADMIN — ACETAMINOPHEN 975 MG: 325 TABLET ORAL at 06:47

## 2025-05-22 RX ADMIN — HYDROMORPHONE HYDROCHLORIDE 0.2 MG: 1 INJECTION, SOLUTION INTRAMUSCULAR; INTRAVENOUS; SUBCUTANEOUS at 10:50

## 2025-05-22 RX ADMIN — CELECOXIB 400 MG: 200 CAPSULE ORAL at 06:47

## 2025-05-22 RX ADMIN — Medication 50 MCG: at 08:07

## 2025-05-22 RX ADMIN — HYDROMORPHONE HYDROCHLORIDE 0.2 MG: 1 INJECTION, SOLUTION INTRAMUSCULAR; INTRAVENOUS; SUBCUTANEOUS at 10:46

## 2025-05-22 RX ADMIN — PROPOFOL 200 MG: 10 INJECTION, EMULSION INTRAVENOUS at 07:40

## 2025-05-22 RX ADMIN — HYDROMORPHONE HYDROCHLORIDE 0.2 MG: 1 INJECTION, SOLUTION INTRAMUSCULAR; INTRAVENOUS; SUBCUTANEOUS at 09:55

## 2025-05-22 RX ADMIN — Medication 100 MCG: at 07:54

## 2025-05-22 RX ADMIN — Medication 10 MG: at 08:26

## 2025-05-22 RX ADMIN — Medication 10 MG: at 08:13

## 2025-05-22 RX ADMIN — EPHEDRINE SULFATE 5 MG: 50 INJECTION, SOLUTION INTRAVENOUS at 08:06

## 2025-05-22 RX ADMIN — DEXAMETHASONE SODIUM PHOSPHATE 8 MG: 4 INJECTION, SOLUTION INTRAMUSCULAR; INTRAVENOUS at 08:13

## 2025-05-22 RX ADMIN — SODIUM CHLORIDE, POTASSIUM CHLORIDE, SODIUM LACTATE AND CALCIUM CHLORIDE: 600; 310; 30; 20 INJECTION, SOLUTION INTRAVENOUS at 07:37

## 2025-05-22 RX ADMIN — FENTANYL CITRATE 50 MCG: 50 INJECTION, SOLUTION INTRAMUSCULAR; INTRAVENOUS at 08:26

## 2025-05-22 RX ADMIN — EPHEDRINE SULFATE 5 MG: 50 INJECTION, SOLUTION INTRAVENOUS at 08:33

## 2025-05-22 RX ADMIN — LIDOCAINE HYDROCHLORIDE 100 MG: 20 INJECTION, SOLUTION EPIDURAL; INFILTRATION; INTRACAUDAL; PERINEURAL at 07:40

## 2025-05-22 RX ADMIN — Medication 10 MG: at 08:53

## 2025-05-22 ASSESSMENT — PAIN SCALES - GENERAL
PAINLEVEL_OUTOF10: 0 - NO PAIN
PAINLEVEL_OUTOF10: 0 - NO PAIN
PAINLEVEL_OUTOF10: 1
PAINLEVEL_OUTOF10: 1
PAINLEVEL_OUTOF10: 0 - NO PAIN
PAINLEVEL_OUTOF10: 0 - NO PAIN
PAINLEVEL_OUTOF10: 3

## 2025-05-22 ASSESSMENT — ENCOUNTER SYMPTOMS
CHEST TIGHTNESS: 0
CHILLS: 0
WEAKNESS: 0
FEVER: 0
DECREASED CONCENTRATION: 0
COUGH: 0
ABDOMINAL PAIN: 0

## 2025-05-22 ASSESSMENT — PAIN - FUNCTIONAL ASSESSMENT
PAIN_FUNCTIONAL_ASSESSMENT: 0-10

## 2025-05-22 ASSESSMENT — COLUMBIA-SUICIDE SEVERITY RATING SCALE - C-SSRS
1. IN THE PAST MONTH, HAVE YOU WISHED YOU WERE DEAD OR WISHED YOU COULD GO TO SLEEP AND NOT WAKE UP?: NO
6. HAVE YOU EVER DONE ANYTHING, STARTED TO DO ANYTHING, OR PREPARED TO DO ANYTHING TO END YOUR LIFE?: NO
2. HAVE YOU ACTUALLY HAD ANY THOUGHTS OF KILLING YOURSELF?: NO

## 2025-05-22 NOTE — ANESTHESIA PREPROCEDURE EVALUATION
Patient: Radha Hyster    Procedure Information       Date/Time: 05/22/25 0730    Procedure: ANASTOMOSIS, TUBOTUBAL (Bilateral) - 4 hours    Location: [facility] OR  / [facility] OR    Surgeons: Ramone Vega MD            Relevant Problems   No relevant active problems       Clinical information reviewed:   Tobacco  Allergies  Meds   Med Hx  Surg Hx  OB Status  Fam Hx  Soc   Hx        NPO Detail:  NPO/Void Status  Carbohydrate Drink Given Prior to Surgery? : N  Date of Last Liquid: 05/21/25  Time of Last Liquid: 1830  Date of Last Solid: 05/21/25  Time of Last Solid: 1830  Last Intake Type: Food  Time of Last Void: 0600         Physical Exam    Airway  Mallampati: II  TM distance: >3 FB  Neck ROM: full  Mouth opening: 3 or more finger widths     Cardiovascular - normal exam   Dental    Pulmonary - normal exam   Abdominal - normal exam         Anesthesia Plan    History of general anesthesia?: yes  History of complications of general anesthesia?: no    ASA 2     general     intravenous induction   Anesthetic plan and risks discussed with patient.    Plan discussed with CRNA.

## 2025-05-22 NOTE — OP NOTE
OPERATIVE NOTE    Patient Name: Radha Wallis    MRN: [id number]  Log ID: [id number]    Surgery Date: 2025    Surgeon:      * Ramone Vega - Primary    Assistant(s):   Fellow: Magi Elena MD    Pre-op Diagnosis: bilateral tubal occlusion    Post-op Diagnosis: resolved bilateral tubal occlusion    Procedures and Anesthesia:  Procedure(s) and Anesthesia Type:     * ANASTOMOSIS, TUBOTUBAL - General    Findings:   - Thickened peritoneum upon entry into abdomen with 5cm omentum adhered to anterior abdominal wall (taken down)  - Grossly normal appearing uterus  - Bilateral tubes with 2.5cm tube removed -- appropriate amount of residual tube on either side to perform reanastamosis  - No dilation of either tube  - 1cm paratubal cyst on left fallopian tube  - Bilateral spill of methylene dye following case conclusion    Estimated Blood Loss: 20cc    IVF: 1350cc    UOP: 550cc    Specimens:   Left paratubal cyst    Diagnosis Code(s): Pre-Op Diagnosis Codes:      * Tubal occlusion [N97.1]    Indications: This is a 44 y.o.  who presents with history of bilateral partial salpingectomy at time of most recent  section. Now desiring reanastamosis.  The proposed procedure, risks, benefits, and alternatives were discussed with the patient in detail including bleeding, infection, and damage to surrounding structures.  All the patient's questions were answered, and the patient voiced understanding.  The patient desires to proceed with surgery. The consents were signed.     Procedure: The patient was taken to the operating room where general anesthesia was found to be adequate. She was placed in the dorsolithotomy position then prepped and draped in a sterile fashion. A Rodríguez catheter was inserted into the bladder and the Zami uterine manipulator was inserted in the usual fashion.     Approximately 2 cm superior to symphysis pubis, an 8 cm low transverse incision was made. This was carried down to the underlying layer  of the fascia, which was incised in the midline and extended bilaterally using the cautery. Kocher clamps were then applied superiorly and inferiorly and the fascia was dissected off the rectus muscles beneath. The rectus muscles were divided and the peritoneal cavity was identified and entered. This incision was extended and we placed the Palmer retractor. We first inspected the right tube and there was adequate tubal length to perform the reanastomosis. We pushed chromopertubation dye through the manipulator and there was no spillage, confirming tubal closure. We next injected dilute vasopressin (20 units in 200 mL) into the mesosalpinx. 2-0 vicryl was used to grasp proximal end of the tubal scar and Iris scissors were used to excise proximal portion of scar. Dye was again passed and spillage was noted on proximal portion of tube. Next, 2-0 vicryl was used to grasp distal scar, and epidural catheter was threaded through distal fimbriae in order to delineate site of tubal scar. Iris scissors were again used to excise scarred portion and passed off as surgical waste. The epidural catheter passed completely, confirming opening of distal portion of tube. The 2-0 vicryl was used at this time to bring together mesosalpinx inferior to future reanastamosis site.  Once the tubes were reapproximated, there were 4 circumferential sutures of 7-0 Vicryl placed at 6, 3,  9 and 12 o'clock. After this was accomplished and the suture material had been tied down, we placed serosal sutures in similar fashion with 7-0 Vicryl. Once we accomplished this on the other side in an identical fashion, the procedure was concluded. Of note, along the left fallopian tube there was noted to be a 1cm paratubal cyst which was easily excised and passed off for surgical pathology.     We did perform chromopertubation following the reapproximation of the distal and proximal tubal stumps. The chromopertubation was successful bilaterally. The  anastomosis was very satisfactory. Therefore, we concluded the procedure at this time. The pelvis was irrigated, cleared of all clot and debris.     We closed the fascia using 0 Vicryl in a running fashion starting at either apex and meeting in the midline. The subcutaneous fat was then irrigated and bovied for hemostasis. We placed an additional 3-0 suture into the subcutaneous fat to close the dead space and the skin was closed using 4-0 Vicryl in subcuticular fashion.     All sponge, lap, and needle counts were correct x2. She was returned to recovery room in stable condition. She is expected to go home later today.    Dr Vega performed the entire procedure with the assitance of the resident/fellow.    Signature: Magi Elena MD  Date: May 22, 2025  Time: 4:17 PM

## 2025-05-22 NOTE — ANESTHESIA PROCEDURE NOTES
Airway  Date/Time: 5/22/2025 7:44 AM  Reason: elective    Airway not difficult    Staffing  Performed: MYCHAL   Authorized by: David Chaney DO    Performed by: MYCHAL Garcia  Patient location during procedure: OR    Patient Condition  Indications for airway management: anesthesia  Patient position: sniffing  MILS maintained throughout  Sedation level: deep     Final Airway Details   Preoxygenated: yes  Final airway type: endotracheal airway  Successful airway: ETT  Cuffed: yes   Successful intubation technique: direct laryngoscopy  Adjuncts used in placement: intubating stylet  Endotracheal tube insertion site: oral  Blade: Hunter  Blade size: #3  ETT size (mm): 7.5  Cormack-Lehane Classification: grade I - full view of glottis  Placement verified by: capnometry   Measured from: lips  ETT to lips (cm): 22  Number of attempts at approach: 1  Number of other approaches attempted: 0    Additional Comments  Lips and teeth in pre-anesthetic condition

## 2025-05-22 NOTE — H&P
"PreOp H+P    History Of Present Illness  Radha Wallis is a 44 y.o. female presenting for scheduled surgery: tubal reanastamosis.     Past Medical History  She has no past medical history on file.    Surgical History  She has a past surgical history that includes Other surgical history (2022) and Tubal ligation.     OB History  OB History    Para Term  AB Living   7 6 6 0 1 6   SAB IAB Ectopic Multiple Live Births   1 0 0 1 6      # Outcome Date GA Lbr Dion/2nd Weight Sex Type Anes PTL Lv   7 SAB            6 Term            5 Term            4 Term            3 Term            2 Term            1 Term                Review of Systems   Constitutional:  Negative for chills and fever.   Respiratory:  Negative for cough and chest tightness.    Cardiovascular:  Negative for chest pain.   Gastrointestinal:  Negative for abdominal pain.   Neurological:  Negative for syncope and weakness.   Psychiatric/Behavioral:  Negative for decreased concentration.         Physical Exam  Vitals reviewed.   Constitutional:       Appearance: Normal appearance.   HENT:      Head: Normocephalic and atraumatic.   Eyes:      Pupils: Pupils are equal, round, and reactive to light.   Cardiovascular:      Rate and Rhythm: Regular rhythm.   Pulmonary:      Effort: Pulmonary effort is normal.   Abdominal:      Palpations: Abdomen is soft.   Skin:     General: Skin is warm.   Neurological:      General: No focal deficit present.      Mental Status: She is alert and oriented to person, place, and time.   Psychiatric:         Mood and Affect: Mood normal.         Behavior: Behavior normal.          Last Recorded Vitals: to be done on admission    Relevant Results  Medications  Current Medications[1]    Labs  CBC  No results found for: \"WBC\", \"NRBC\", \"RBC\", \"HGB\", \"HCT\", \"MCV\", \"MCH\", \"MCHC\", \"RDW\", \"PLT\", \"MPV\"    CMP  No results found for: \"GLUCOSE\", \"NA\", \"K\", \"CL\", \"CO2\", \"ANIONGAP\", \"BUN\", \"CREATININE\", \"EGFR\", \"CALCIUM\", " Labs ess NL "\"ALBUMIN\", \"ALKPHOS\", \"PROT\", \"AST\", \"BILITOT\", \"ALT\"    Coagulation   No results found for: \"PROTIME\", \"INR\", \"APTT\", \"FIBRINOGEN\"    Pregnancy Tests  HCG, Urine   Date Value Ref Range Status   05/22/2025 NEGATIVE NEGATIVE Final       Imaging   Results for orders placed during the hospital encounter of 01/20/24    US PELVIS TRANSABDOMINAL WITH TRANSVAGINAL    Narrative  Interpreted By:  Gera Marie,  STUDY:  US PELVIS TRANSABDOMINAL WITH TRANSVAGINAL;  1/20/2024 11:37 am    INDICATION:  Signs/Symptoms:Fertility testing.    COMPARISON:  None.    ACCESSION NUMBER(S):  [id number]    ORDERING CLINICIAN:  CHUY ORTA    TECHNIQUE:  Multiple multiplanar static gray scale, color and spectral waveform  sonographic images of the pelvis were obtained.  Transabdominal and  endovaginal ultrasound was performed.    FINDINGS:  UTERUS:  Uterus is normal in size with a smooth external contour measuring  10.2 x 4.9 x 6.6 cm.  No discrete myometrial lesion is identified.    ENDOMETRIUM:  There is nonspecific trace hypoechoic fluid of the endometrial canal.  Endometrial complex is thickened measuring 1.7 cm.    RIGHT ADNEXA:  Right ovary is normal in size containing small physiologic  cysts/follicles. Right ovary measures  2.7 x 1.4 x 1.5 cm. Blood flow  can be seen to the right ovary.   No additional adnexal mass or fluid  collection is seen.    LEFT ADNEXA:  Left ovary measures 3.4 x 2.3 x 3.2 cm. Left ovary contains a rounded  heterogeneously hypoechoic 2.4 x 1.5 x 1.5 cm lesion most likely a  small hemorrhagic or involuting cyst. Blood flow is demonstrated to  the left ovary. No additional adnexal mass or fluid collection is  seen.    CUL DE SAC:  No free fluid is visualized.    Impression  1.  Thickening of the endometrial complex measuring 1.7 cm may  represent hyperplasia. Nonspecific trace hypoechoic fluid of the  endometrial canal.  2. Normal size of both ovaries with demonstrated blood flow.  3. Left ovarian 2.4 " cm rounded heterogeneously hypoechoic lesion most  likely is a hemorrhagic or involuting cyst.    MACRO:  None.    Signed by: Gera Marie 1/22/2024 8:19 AM  Dictation workstation:   [id number]       Assessment/Plan   Assessment & Plan  Tubal occlusion      Plan for tubal reanastamosis           Magi Elena MD         [1] No current facility-administered medications for this encounter.

## 2025-05-22 NOTE — ANESTHESIA POSTPROCEDURE EVALUATION
Patient: Radha Hyster    Procedure Summary       Date: 05/22/25 Room / Location: [facility] OR 08 / [facility] OR    Anesthesia Start: 0736 Anesthesia Stop: 1130    Procedure: ANASTOMOSIS, TUBOTUBAL (Bilateral) Diagnosis:       Tubal occlusion      (Tubal occlusion [N97.1])    Surgeons: Ramone Vega MD Responsible Provider: David Chaney DO    Anesthesia Type: general ASA Status: 2            Anesthesia Type: general    Vitals Value Taken Time   /62 05/22/25 11:30   Temp 36 °C (96.8 °F) 05/22/25 11:29   Pulse 98 05/22/25 11:33   Resp 14 05/22/25 11:33   SpO2 95 % 05/22/25 11:33   Vitals shown include unfiled device data.    Anesthesia Post Evaluation    Patient location during evaluation: PACU  Patient participation: complete - patient participated  Level of consciousness: sedated  Pain management: adequate  Airway patency: patent  Cardiovascular status: acceptable  Respiratory status: acceptable  Hydration status: acceptable  Postoperative Nausea and Vomiting: none        No notable events documented.

## 2025-05-25 ENCOUNTER — OFFICE VISIT (OUTPATIENT)
Dept: URGENT CARE | Age: 45
End: 2025-05-25
Payer: COMMERCIAL

## 2025-05-25 VITALS
SYSTOLIC BLOOD PRESSURE: 119 MMHG | HEART RATE: 71 BPM | WEIGHT: 180 LBS | BODY MASS INDEX: 28.93 KG/M2 | OXYGEN SATURATION: 98 % | HEIGHT: 66 IN | DIASTOLIC BLOOD PRESSURE: 81 MMHG | TEMPERATURE: 98.2 F | RESPIRATION RATE: 20 BRPM

## 2025-05-25 DIAGNOSIS — R39.9 LOWER URINARY TRACT SYMPTOMS (LUTS): Primary | ICD-10-CM

## 2025-05-25 DIAGNOSIS — R30.0 BURNING WITH URINATION: ICD-10-CM

## 2025-05-25 LAB
POC APPEARANCE, URINE: CLEAR
POC BILIRUBIN, URINE: NEGATIVE
POC BLOOD, URINE: NEGATIVE
POC COLOR, URINE: YELLOW
POC GLUCOSE, URINE: NEGATIVE MG/DL
POC KETONES, URINE: NEGATIVE MG/DL
POC LEUKOCYTES, URINE: NEGATIVE
POC NITRITE,URINE: NEGATIVE
POC PH, URINE: 6 PH
POC PROTEIN, URINE: NEGATIVE MG/DL
POC SPECIFIC GRAVITY, URINE: 1.02
POC UROBILINOGEN, URINE: 0.2 EU/DL
PREGNANCY TEST URINE, POC: NEGATIVE

## 2025-05-25 NOTE — PROGRESS NOTES
"Subjective   Patient ID: Radha Wallis is a 44 y.o. female. They present today with a chief complaint of UTI (Pressure, frequency, slight burning with urination x 1 day ).    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 She has never been exposed to tobacco smoke. She has never used smokeless tobacco. She reports current alcohol use. She reports that she does not use drugs.    Review of Systems  Review of Systems                               Objective    Vitals:    05/25/25 1910   BP: 119/81   BP Location: Right arm   Patient Position: Sitting   BP Cuff Size: Adult   Pulse: 71   Resp: 20   Temp: 36.8 °C (98.2 °F)   TempSrc: Temporal   SpO2: 98%   Weight: 81.6 kg (180 lb)   Height: 1.676 m (5' 6\")     Patient's last menstrual period was 05/14/2025.    Physical Exam    Procedures    Point of Care Test & Imaging Results from this visit  Results for orders placed or performed in visit on 05/25/25   POCT pregnancy, urine manually resulted   Result Value Ref Range    Preg Test, Ur Negative Negative   POCT UA Automated manually resulted   Result Value Ref Range    POC Color, Urine Yellow Straw, Yellow, Light-Yellow    POC Appearance, Urine Clear Clear    POC Glucose, Urine NEGATIVE NEGATIVE mg/dl    POC Bilirubin, Urine NEGATIVE NEGATIVE    POC Ketones, Urine NEGATIVE NEGATIVE mg/dl    POC Specific Gravity, Urine 1.020 1.005 - 1.035    POC Blood, Urine NEGATIVE NEGATIVE    POC PH, Urine 6.0 No Reference Range Established PH    POC Protein, Urine NEGATIVE NEGATIVE mg/dl    POC Urobilinogen, Urine 0.2 0.2, 1.0 EU/DL    Poc Nitrite, Urine NEGATIVE NEGATIVE    POC Leukocytes, Urine NEGATIVE NEGATIVE      Imaging  No results found.    Cardiology, Vascular, and Other Imaging  No " other imaging results found for the past 2 days      Diagnostic study results (if any) were reviewed by DA Bertrand.    Assessment/Plan   Allergies, medications, history, and pertinent labs/EKGs/Imaging reviewed by DA Bertrand.     Medical Decision Making  ***    Orders and Diagnoses  Diagnoses and all orders for this visit:  Lower urinary tract symptoms (LUTS)  -     Urine Culture  Burning with urination  -     POCT pregnancy, urine manually resulted  -     POCT UA Automated manually resulted      Medical Admin Record      Patient disposition: { [id number]:[id number]}    Electronically signed by DA Bertrand  7:28 PM           [1] (Not in a hospital admission)  [2] No past medical history on file.  [3]   Past Surgical History:  Procedure Laterality Date    OTHER SURGICAL HISTORY  2022     section    TUBAL LIGATION        pertinent labs/EKGs/Imaging reviewed by DA Bertrand.     Medical Decision Making  UA negative, will send for culture and notify patient of the results.  At time of discharge patient was clinically well-appearing and HDS for outpatient management. She was educated regarding diagnosis, supportive care, OTC and Rx medications. She was given the opportunity to ask questions prior to discharge.  They verbalized understanding of my discussion of the plans for treatment, expected course, indications to return to  or seek further evaluation in ED, and the need for timely follow up as directed.       Orders and Diagnoses  Diagnoses and all orders for this visit:  Lower urinary tract symptoms (LUTS)  -     POCT pregnancy, urine manually resulted  -     POCT UA Automated manually resulted  -     Urine Culture      Medical Admin Record      Patient disposition: Home    Electronically signed by DA Bertrand  4:08 PM           [1] (Not in a hospital admission)   [2] No past medical history on file.  [3]   Past Surgical History:  Procedure Laterality Date    OTHER SURGICAL HISTORY  2022     section    TUBAL LIGATION

## 2025-05-27 LAB — BACTERIA UR CULT: NORMAL

## 2025-05-28 ASSESSMENT — ENCOUNTER SYMPTOMS
SORE THROAT: 0
SHORTNESS OF BREATH: 0
SINUS PAIN: 0
SINUS PRESSURE: 0
DYSURIA: 1
COUGH: 0
FREQUENCY: 1
RHINORRHEA: 0
WHEEZING: 0
CHILLS: 0
HEMATURIA: 1
ABDOMINAL PAIN: 0
FEVER: 0

## 2025-06-03 ENCOUNTER — TELEPHONE (OUTPATIENT)
Dept: ENDOCRINOLOGY | Facility: CLINIC | Age: 45
End: 2025-06-03

## 2025-06-03 NOTE — TELEPHONE ENCOUNTER
Called the patient she verified her name and date of birth. I advised I had her off work until 6-22-25 I advised we should wait until Tuesday at her post op if she can go back sooner Patient verbalized an understanding    MERON SAWYER 06/03/25 10:11 AM

## 2025-06-05 LAB
LABORATORY COMMENT REPORT: NORMAL
PATH REPORT.FINAL DX SPEC: NORMAL
PATH REPORT.GROSS SPEC: NORMAL
PATH REPORT.RELEVANT HX SPEC: NORMAL
PATH REPORT.TOTAL CANCER: NORMAL

## 2025-06-10 ENCOUNTER — TELEMEDICINE (OUTPATIENT)
Dept: ENDOCRINOLOGY | Facility: CLINIC | Age: 45
End: 2025-06-10
Payer: COMMERCIAL

## 2025-06-10 VITALS — BODY MASS INDEX: 28.93 KG/M2 | HEIGHT: 66 IN | WEIGHT: 180 LBS

## 2025-06-10 DIAGNOSIS — Z09 POSTOP CHECK: Primary | ICD-10-CM

## 2025-06-10 ASSESSMENT — COLUMBIA-SUICIDE SEVERITY RATING SCALE - C-SSRS
1. IN THE PAST MONTH, HAVE YOU WISHED YOU WERE DEAD OR WISHED YOU COULD GO TO SLEEP AND NOT WAKE UP?: NO
2. HAVE YOU ACTUALLY HAD ANY THOUGHTS OF KILLING YOURSELF?: NO
6. HAVE YOU EVER DONE ANYTHING, STARTED TO DO ANYTHING, OR PREPARED TO DO ANYTHING TO END YOUR LIFE?: NO

## 2025-06-10 ASSESSMENT — PAIN SCALES - GENERAL: PAINLEVEL_OUTOF10: 0-NO PAIN

## 2025-06-10 NOTE — PROGRESS NOTES
"  Virtual or Telephone Consent: An interactive audio and video telecommunication system which permits real time communications between the patient (at the originating site) and provider (at the distant site) was utilized to provide this telehealth service  MD reviewed, Authorization status not noted.    Post Op Visit    Seen Radha Hyster s/p tubal reanastamosis with Dr. Vega on 5/22. Doing well.   Incisions are healing well.   No issues with bowel or bladder.   No nausea, vomiting, fever or chills  Intermittent ibuprofen for  pain medications.   No vaginal discharge or abnormal bleeding.     Vitals: Ht 1.676 m (5' 6\")   Wt 81.6 kg (180 lb)   LMP 06/08/2025 (Exact Date)   BMI 29.05 kg/m²   Abdomen: soft, non-tender, incisions healing.- per patient no redness, swelling or drainage    Prior labs:   CBC  Date: 5/20/2025  Plt: 243 (Ref range: 150 - 450 x10*3/uL)  Hct: 36.1 (Ref range: 36.0 - 46.0 %)    CMP  Date: 5/20/2025   BUN: 10 (Ref range: 6 - 23 mg/dL)  Cre: 0.66 (Ref range: 0.50 - 1.05 mg/dL)  AST: No results found for requested labs within last 365 days.  ALT: No results found for requested labs within last 365 days.    Assessment:   Normal post op    Plan:  Reviewed surgery  Follow up with Dr. Vega as planned.  Aware we do not prescribe fertility medications after she turns 45.  No orders of the defined types were placed in this encounter.      Fertility Plan Update:    Intimate Exam Performed: No, an intimate exam was not performed at this encounter.     Deana Raza  06/10/2025  11:14 AM  "

## 2025-07-09 ENCOUNTER — TELEMEDICINE (OUTPATIENT)
Dept: ENDOCRINOLOGY | Facility: CLINIC | Age: 45
End: 2025-07-09
Payer: COMMERCIAL

## 2025-07-09 VITALS — WEIGHT: 185 LBS | BODY MASS INDEX: 29.73 KG/M2 | HEIGHT: 66 IN

## 2025-07-09 DIAGNOSIS — Z31.41 FERTILITY TESTING: ICD-10-CM

## 2025-07-09 DIAGNOSIS — Z09 FOLLOW-UP EXAMINATION FOLLOWING SURGERY: Primary | ICD-10-CM

## 2025-07-09 PROCEDURE — 1036F TOBACCO NON-USER: CPT | Performed by: OBSTETRICS & GYNECOLOGY

## 2025-07-09 PROCEDURE — 99024 POSTOP FOLLOW-UP VISIT: CPT | Performed by: OBSTETRICS & GYNECOLOGY

## 2025-07-09 PROCEDURE — 3008F BODY MASS INDEX DOCD: CPT | Performed by: OBSTETRICS & GYNECOLOGY

## 2025-07-09 ASSESSMENT — PAIN SCALES - GENERAL: PAINLEVEL_OUTOF10: 0-NO PAIN

## 2025-07-09 NOTE — PROGRESS NOTES
"  Virtual or Telephone Consent: An interactive audio and video telecommunication system which permits real time communications between the patient (at the originating site) and provider (at the distant site) was utilized to provide this telehealth service  N/A    Follow Up Visit HPI    Radha is a 44 y.o.  female presenting today for a follow up visit.  She is status post bilateral fallopian tube reanastomosis on 2025.  Both fallopian tubes were able to be repaired during the procedure.  Since her procedure, the patient reports that she is doing well and has no complaints.  Pain is well-controlled, she is back to regular activity, and her incision is well-healed.  She is tolerating a regular oral diet, and is having normal bowel and bladder function.  She reports having regular menses.    Testing to date:   Result Date Done   TSH: 1.10 (Ref range: 0.44 - 3.98 mIU/L) 2024   AMH: 0.026 (Ref range: <=6.282 ng/mL) 2024   PRL: No results found for requested labs within last 365 days. No results found for requested labs within last 365 days.   Testosterone: No results found for requested labs within last 365 days. No results found for requested labs within last 365 days.   DHEAS: No results found for requested labs within last 365 days. No results found for requested labs within last 365 days.         Medical History[1]  Surgical History[2]  Medications Ordered Prior to Encounter[3]    BMI:   BMI Readings from Last 1 Encounters:   25 29.86 kg/m²     VITALS:  Ht 1.676 m (5' 6\")   Wt 83.9 kg (185 lb)   LMP 2025 (Exact Date)   BMI 29.86 kg/m²   LMP: Patient's last menstrual period was 2025 (exact date).    This is a telehealth visit    ASSESSMENT     Jen is a 44 y.o.  female who presents today for a follow-up visit.  She has demonstrated a satisfactory postoperative recovery following bilateral fallopian tube reanastomosis performed on 2025.  At this point the patient " is cleared to proceed with attempts to become pregnant.  If she finds that she is pregnant, she understands that she is at increased risk for ectopic pregnancy given her recent procedure.  Therefore, she is encouraged to contact our office as soon as possible with a positive pregnancy test that we can proceed with blood work and ultrasound testing.  Patient verbalizes understanding of this.  If the patient is not pregnant in the next 3 to 5 months, she is to set up a follow-up appointment so that we can evaluate for continued tubal patency.  All questions were answered to the patient's satisfaction.    Routine Testing  Fertility Center  STDs Within 1 year   Genetic carrier Waiver/Completed   T&S Within 1 year   AMH Within 1 year   TSH Within 1 year   Rubella/Varicella Within 5 years     BMI Testing  Fertility Center  CBC Within 1 year   CMP Within 1 year   HgbA1c Within 1 year   Mag, Phos, Vit D <18 Within 1 year   MFM > 40  REQ   Wt loss consult > 40 OPT     PLAN  [X ] patient to contact our office with positive pregnancy test  [X ] if patient is not pregnant in the next 3 to 5 months she is to schedule a follow-up appointment      FOLLOW UP   As above    MD Completion:  Ectopic Risk: Yes  Medically Complex: No  Outstanding boarding pass items: none    Fertility Plan Update: Timed intercourse    Intimate Exam Performed: No, an intimate exam was not performed at this encounter.     Ramone Vega  2025  11:05 AM             [1] History reviewed. No pertinent past medical history.  [2]   Past Surgical History:  Procedure Laterality Date    OTHER SURGICAL HISTORY  2022     section    TUBAL LIGATION     [3]   Current Outpatient Medications on File Prior to Visit   Medication Sig Dispense Refill    acetaminophen (Tylenol) 325 mg tablet Take 2 tablets (650 mg) by mouth every 6 hours if needed for mild pain (1 - 3) for up to 20 doses. 20 tablet 0    famotidine (Pepcid) 20 mg tablet Take 1 tablet  (20 mg) by mouth once daily.      ibuprofen 600 mg tablet Take 1 tablet (600 mg) by mouth every 6 hours if needed for moderate pain (4 - 6) for up to 20 doses. 20 tablet 0    loratadine (Claritin) 10 mg tablet Take 1 tablet (10 mg) by mouth once daily. (Patient not taking: Reported on 7/9/2025)      ondansetron (Zofran) 4 mg tablet Take 1 tablet (4 mg) by mouth every 6 hours if needed for nausea for up to 20 doses. 20 tablet 0    oxyCODONE (Roxicodone) 5 mg immediate release tablet Take 1 tablet (5 mg) by mouth every 6 hours if needed for severe pain (7 - 10) for up to 10 doses. 10 tablet 0    polyethylene glycol (Glycolax, Miralax) 17 gram packet Mix one packet (17 g) with 4 to 6 ounces of water or other beverage and take by mouth once daily as needed (for constipation) for up to 10 doses. 10 packet 0     No current facility-administered medications on file prior to visit.

## 2025-07-18 DIAGNOSIS — N97.9 FEMALE INFERTILITY: ICD-10-CM

## 2025-07-21 ENCOUNTER — LAB REQUISITION (OUTPATIENT)
Dept: LAB | Facility: HOSPITAL | Age: 45
End: 2025-07-21
Payer: COMMERCIAL

## 2025-07-21 DIAGNOSIS — N97.9 FEMALE INFERTILITY, UNSPECIFIED: ICD-10-CM

## 2025-07-21 LAB — PROGEST SERPL-MCNC: 8.8 NG/ML

## 2025-07-21 PROCEDURE — 84144 ASSAY OF PROGESTERONE: CPT

## 2025-07-21 NOTE — PROGRESS NOTES
Patient going to  lab for CD 18 P4 level following natural TIC cycle. Patient messaged in that she received LH peak on 7/15.     Will contact patient with results and plan.   Brittnee Dyer 07/21/25 9:18 AM    Component      Latest Ref Rng 7/21/2025   Progesterone      ng/mL 8.8      Monitoring patient, CD 18 P4 Level for Natural/TIC cycle: P4 is ovulatory @ 8.8. Patient to call with menses or with + UPT. Plan to be communicated by RN. Plan agreed upon by Dr. Serna. Silvana Villavicencio, CNP 07/21/25 4:10 PM     Patient aware p4 level ovulatory. She will call with +UPT or menses.   Brittnee Dyer 07/21/25 4:09 PM

## 2025-08-27 ENCOUNTER — TELEMEDICINE (OUTPATIENT)
Dept: ENDOCRINOLOGY | Facility: CLINIC | Age: 45
End: 2025-08-27
Payer: COMMERCIAL

## 2025-08-27 DIAGNOSIS — Z31.41 FERTILITY TESTING: Primary | ICD-10-CM

## 2025-08-27 DIAGNOSIS — Z01.812 ENCOUNTER FOR PREPROCEDURAL LABORATORY EXAMINATION: ICD-10-CM

## 2025-08-27 PROCEDURE — 99214 OFFICE O/P EST MOD 30 MIN: CPT | Performed by: OBSTETRICS & GYNECOLOGY

## 2025-08-27 RX ORDER — LETROZOLE 2.5 MG/1
5 TABLET, FILM COATED ORAL DAILY
Qty: 10 TABLET | Refills: 0 | Status: SHIPPED | OUTPATIENT
Start: 2025-08-27 | End: 2025-11-25

## (undated) DEVICE — SYRINGE, 10 CC, SLIP TIP

## (undated) DEVICE — DEVICE, GELPOINT, SINGLE PORT

## (undated) DEVICE — Device

## (undated) DEVICE — SYRINGE, 20 CC, LUER LOCK, MONOJECT, W/O CAP, LF

## (undated) DEVICE — APPLICATOR, CHLORAPREP, W/ORANGE TINT, 26ML

## (undated) DEVICE — CATHETER, IV, ANGIOCATH, SPECIAL PLACEMENT, 14 G X 3.25 IN, FEP POLYMER

## (undated) DEVICE — GLOVE, SURGICAL, PROTEXIS PI MICRO, 8.0, PF, LF

## (undated) DEVICE — CONTAINER, SPECIMEN, 4 OZ, OR PEEL PACK, STERILE

## (undated) DEVICE — CATHETER TRAY, URETHRAL, FOLEY, 16 FR, SILICONE

## (undated) DEVICE — SUTURE, CTD, VICRYL, 2-0, UND, BR, CT-2

## (undated) DEVICE — DRAPE, UNDERBUTTOCKS

## (undated) DEVICE — SOLUTION, IRRIGATION, STERILE WATER, 1000 ML, POUR BOTTLE

## (undated) DEVICE — BOOT, SUTURE-AID, YELLOW, STERILE, LF

## (undated) DEVICE — SUTURE, MONOCRYL, 4-0, 27 IN, PS-2, UNDYED

## (undated) DEVICE — DRAPE PACK, LAVH, W/ATTACHED LEGGINGS, W/POUCH, 100 X 114 IN, LF, STERILE

## (undated) DEVICE — SUTURE, VICRYL, 7-0, 18 IN, TG1408, DA, VIOLET

## (undated) DEVICE — DRAPE, MICROSCOPE, W/REMOVABLE LENS

## (undated) DEVICE — EXTENSION SET, IV, 30 IN, LUER LOCK

## (undated) DEVICE — SPEAR, EYE, SURGICAL, WECK-CELL, CELLULOSE

## (undated) DEVICE — CORD, CAUTERY, BIOPOLAR FORCEP, 12FT

## (undated) DEVICE — MANIPULATOR, INJECTOR, UTERINE, HUMI, KRONNER, 5 MM, 33 CM

## (undated) DEVICE — GLOVE, SURGICAL, PROTEXIS PI BLUE W/NEUTHERA, 8.0, PF, LF

## (undated) DEVICE — SOLUTION, IRRIGATION, SODIUM CHLORIDE 0.9%, 1000 ML, POUR BOTTLE

## (undated) DEVICE — SYRINGE, 10 CC, LUER LOCK

## (undated) DEVICE — POSITIONING, THE PINK PAD, PIGAZZI SYSTEM

## (undated) DEVICE — NEEDLE, HYPODERMIC, MONOJECT, 27 G X 1.5 IN

## (undated) DEVICE — TOWEL PACK, STERILE, 4/PACK, BLUE

## (undated) DEVICE — PREP TRAY, VAGINAL

## (undated) DEVICE — SUTURE, VICRYL 0, TAPER POINT, CT-1 VIOLET 27 INCH

## (undated) DEVICE — SYRINGE, 60 CC, IRRIGATION, BULB, CONTRO-BULB, PAPER POUCH